# Patient Record
Sex: FEMALE | Race: WHITE | NOT HISPANIC OR LATINO | Employment: OTHER | URBAN - METROPOLITAN AREA
[De-identification: names, ages, dates, MRNs, and addresses within clinical notes are randomized per-mention and may not be internally consistent; named-entity substitution may affect disease eponyms.]

---

## 2019-10-30 ENCOUNTER — TRANSCRIBE ORDERS (OUTPATIENT)
Dept: ADMINISTRATIVE | Facility: HOSPITAL | Age: 74
End: 2019-10-30

## 2019-10-30 DIAGNOSIS — Z12.31 ENCOUNTER FOR SCREENING MAMMOGRAM FOR BREAST CANCER: Primary | ICD-10-CM

## 2019-12-13 ENCOUNTER — HOSPITAL ENCOUNTER (OUTPATIENT)
Dept: MAMMOGRAPHY | Facility: HOSPITAL | Age: 74
Discharge: HOME | End: 2019-12-13
Payer: COMMERCIAL

## 2019-12-13 VITALS — HEIGHT: 64 IN | BODY MASS INDEX: 25.58 KG/M2

## 2019-12-13 DIAGNOSIS — Z12.31 ENCOUNTER FOR SCREENING MAMMOGRAM FOR BREAST CANCER: ICD-10-CM

## 2019-12-13 PROCEDURE — 77063 BREAST TOMOSYNTHESIS BI: CPT

## 2019-12-13 PROCEDURE — 77067 SCR MAMMO BI INCL CAD: CPT

## 2019-12-18 ENCOUNTER — APPOINTMENT (OUTPATIENT)
Dept: LAB | Facility: HOSPITAL | Age: 74
End: 2019-12-18
Payer: COMMERCIAL

## 2019-12-18 ENCOUNTER — TRANSCRIBE ORDERS (OUTPATIENT)
Dept: LAB | Facility: HOSPITAL | Age: 74
End: 2019-12-18

## 2019-12-18 DIAGNOSIS — I10 ESSENTIAL HYPERTENSION, MALIGNANT: Primary | ICD-10-CM

## 2019-12-18 DIAGNOSIS — I10 ESSENTIAL HYPERTENSION, MALIGNANT: ICD-10-CM

## 2019-12-18 LAB
ALBUMIN SERPL BCP-MCNC: 4.3 G/DL (ref 3.5–5.7)
ALP SERPL-CCNC: 43 U/L (ref 55–165)
ALT SERPL W P-5'-P-CCNC: 12 U/L (ref 7–52)
ANION GAP SERPL CALCULATED.3IONS-SCNC: 6 MMOL/L (ref 4–13)
AST SERPL W P-5'-P-CCNC: 17 U/L (ref 13–39)
BILIRUB SERPL-MCNC: 0.9 MG/DL (ref 0.2–1)
BUN SERPL-MCNC: 20 MG/DL (ref 7–25)
CALCIUM SERPL-MCNC: 9.6 MG/DL (ref 8.6–10.5)
CHLORIDE SERPL-SCNC: 104 MMOL/L (ref 98–107)
CHOLEST SERPL-MCNC: 222 MG/DL (ref 0–200)
CO2 SERPL-SCNC: 29 MMOL/L (ref 21–31)
CREAT SERPL-MCNC: 0.98 MG/DL (ref 0.6–1.2)
ERYTHROCYTE [DISTWIDTH] IN BLOOD BY AUTOMATED COUNT: 14.2 % (ref 11.5–14.5)
GFR SERPL CREATININE-BSD FRML MDRD: 57 ML/MIN/1.73SQ M
GLUCOSE P FAST SERPL-MCNC: 98 MG/DL (ref 65–99)
HCT VFR BLD AUTO: 39.5 % (ref 42–47)
HDLC SERPL-MCNC: 60 MG/DL
HGB BLD-MCNC: 13.2 G/DL (ref 12–16)
LDLC SERPL CALC-MCNC: 140 MG/DL (ref 0–100)
MCH RBC QN AUTO: 31.5 PG (ref 26–34)
MCHC RBC AUTO-ENTMCNC: 33.5 G/DL (ref 31–37)
MCV RBC AUTO: 94 FL (ref 81–99)
NONHDLC SERPL-MCNC: 162 MG/DL
PLATELET # BLD AUTO: 220 THOUSANDS/UL (ref 149–390)
PMV BLD AUTO: 8.4 FL (ref 8.6–11.7)
POTASSIUM SERPL-SCNC: 4.4 MMOL/L (ref 3.5–5.5)
PROT SERPL-MCNC: 6.8 G/DL (ref 6.4–8.9)
RBC # BLD AUTO: 4.2 MILLION/UL (ref 3.9–5.2)
SODIUM SERPL-SCNC: 139 MMOL/L (ref 134–143)
TRIGL SERPL-MCNC: 110 MG/DL (ref 44–166)
WBC # BLD AUTO: 6.1 THOUSAND/UL (ref 4.8–10.8)

## 2019-12-18 PROCEDURE — 80061 LIPID PANEL: CPT

## 2019-12-18 PROCEDURE — 36415 COLL VENOUS BLD VENIPUNCTURE: CPT

## 2019-12-18 PROCEDURE — 85027 COMPLETE CBC AUTOMATED: CPT

## 2019-12-18 PROCEDURE — 80053 COMPREHEN METABOLIC PANEL: CPT

## 2020-09-25 ENCOUNTER — TRANSCRIBE ORDERS (OUTPATIENT)
Dept: ADMINISTRATIVE | Facility: HOSPITAL | Age: 75
End: 2020-09-25

## 2020-09-25 DIAGNOSIS — R41.3 MEMORY LOSS: Primary | ICD-10-CM

## 2020-09-25 DIAGNOSIS — IMO0001 ASYMMETRICAL RIGHT SENSORINEURAL HEARING LOSS: ICD-10-CM

## 2020-10-15 ENCOUNTER — TRANSCRIBE ORDERS (OUTPATIENT)
Dept: ADMINISTRATIVE | Facility: HOSPITAL | Age: 75
End: 2020-10-15

## 2020-10-15 DIAGNOSIS — Z78.0 ASYMPTOMATIC MENOPAUSAL STATE: ICD-10-CM

## 2020-10-15 DIAGNOSIS — E28.39 OTHER PRIMARY OVARIAN FAILURE: ICD-10-CM

## 2020-10-15 DIAGNOSIS — Z12.31 ENCOUNTER FOR SCREENING MAMMOGRAM FOR MALIGNANT NEOPLASM OF BREAST: Primary | ICD-10-CM

## 2020-10-21 ENCOUNTER — HOSPITAL ENCOUNTER (OUTPATIENT)
Dept: MAMMOGRAPHY | Facility: HOSPITAL | Age: 75
Discharge: HOME | End: 2020-10-21
Payer: COMMERCIAL

## 2020-10-21 ENCOUNTER — HOSPITAL ENCOUNTER (OUTPATIENT)
Dept: BONE DENSITY | Facility: HOSPITAL | Age: 75
Discharge: HOME | End: 2020-10-21
Payer: COMMERCIAL

## 2020-10-21 DIAGNOSIS — Z12.31 ENCOUNTER FOR SCREENING MAMMOGRAM FOR MALIGNANT NEOPLASM OF BREAST: ICD-10-CM

## 2020-10-21 DIAGNOSIS — Z78.0 ASYMPTOMATIC MENOPAUSAL STATE: ICD-10-CM

## 2020-10-21 DIAGNOSIS — E28.39 OTHER PRIMARY OVARIAN FAILURE: ICD-10-CM

## 2020-10-21 PROCEDURE — 77080 DXA BONE DENSITY AXIAL: CPT

## 2020-10-23 ENCOUNTER — HOSPITAL ENCOUNTER (OUTPATIENT)
Dept: MRI IMAGING | Facility: HOSPITAL | Age: 75
Discharge: HOME | End: 2020-10-23
Payer: COMMERCIAL

## 2020-10-23 DIAGNOSIS — R41.3 MEMORY LOSS: ICD-10-CM

## 2020-10-23 DIAGNOSIS — IMO0001 ASYMMETRICAL RIGHT SENSORINEURAL HEARING LOSS: ICD-10-CM

## 2020-10-23 PROCEDURE — 70553 MRI BRAIN STEM W/O & W/DYE: CPT

## 2020-10-23 PROCEDURE — G1004 CDSM NDSC: HCPCS

## 2020-10-23 PROCEDURE — A9577 INJ MULTIHANCE: HCPCS | Performed by: FAMILY MEDICINE

## 2020-10-23 RX ADMIN — GADOBENATE DIMEGLUMINE 7.5 ML: 529 INJECTION, SOLUTION INTRAVENOUS at 08:44

## 2021-01-22 DIAGNOSIS — Z23 ENCOUNTER FOR IMMUNIZATION: ICD-10-CM

## 2021-01-26 ENCOUNTER — IMMUNIZATIONS (OUTPATIENT)
Dept: FAMILY MEDICINE CLINIC | Facility: HOSPITAL | Age: 76
End: 2021-01-26

## 2021-01-26 DIAGNOSIS — Z23 ENCOUNTER FOR IMMUNIZATION: Primary | ICD-10-CM

## 2021-01-26 PROCEDURE — 0011A SARS-COV-2 / COVID-19 MRNA VACCINE (MODERNA) 100 MCG: CPT

## 2021-01-26 PROCEDURE — 91301 SARS-COV-2 / COVID-19 MRNA VACCINE (MODERNA) 100 MCG: CPT

## 2021-03-01 ENCOUNTER — IMMUNIZATIONS (OUTPATIENT)
Dept: FAMILY MEDICINE CLINIC | Facility: HOSPITAL | Age: 76
End: 2021-03-01

## 2021-03-01 DIAGNOSIS — Z23 ENCOUNTER FOR IMMUNIZATION: Primary | ICD-10-CM

## 2021-03-01 PROCEDURE — 0012A SARS-COV-2 / COVID-19 MRNA VACCINE (MODERNA) 100 MCG: CPT

## 2021-03-01 PROCEDURE — 91301 SARS-COV-2 / COVID-19 MRNA VACCINE (MODERNA) 100 MCG: CPT

## 2021-05-13 ENCOUNTER — HOSPITAL ENCOUNTER (OUTPATIENT)
Dept: MAMMOGRAPHY | Facility: HOSPITAL | Age: 76
Discharge: HOME | End: 2021-05-13
Payer: COMMERCIAL

## 2021-05-13 VITALS — HEIGHT: 64 IN | BODY MASS INDEX: 27.31 KG/M2 | WEIGHT: 160 LBS

## 2021-05-13 PROCEDURE — 77063 BREAST TOMOSYNTHESIS BI: CPT

## 2021-05-13 PROCEDURE — 77067 SCR MAMMO BI INCL CAD: CPT

## 2021-06-21 ENCOUNTER — APPOINTMENT (OUTPATIENT)
Dept: LAB | Facility: HOSPITAL | Age: 76
End: 2021-06-21
Payer: COMMERCIAL

## 2021-06-21 DIAGNOSIS — I10 HYPERTENSION, UNSPECIFIED TYPE: ICD-10-CM

## 2021-06-21 LAB
ALBUMIN SERPL BCP-MCNC: 4.4 G/DL (ref 3.5–5.7)
ALP SERPL-CCNC: 44 U/L (ref 55–165)
ALT SERPL W P-5'-P-CCNC: 19 U/L (ref 7–52)
ANION GAP SERPL CALCULATED.3IONS-SCNC: 7 MMOL/L (ref 4–13)
AST SERPL W P-5'-P-CCNC: 23 U/L (ref 13–39)
BILIRUB SERPL-MCNC: 0.9 MG/DL (ref 0.2–1)
BUN SERPL-MCNC: 21 MG/DL (ref 7–25)
CALCIUM SERPL-MCNC: 9.7 MG/DL (ref 8.6–10.5)
CHLORIDE SERPL-SCNC: 104 MMOL/L (ref 98–107)
CO2 SERPL-SCNC: 29 MMOL/L (ref 21–31)
CREAT SERPL-MCNC: 1.18 MG/DL (ref 0.6–1.2)
GFR SERPL CREATININE-BSD FRML MDRD: 45 ML/MIN/1.73SQ M
GLUCOSE P FAST SERPL-MCNC: 98 MG/DL (ref 65–99)
POTASSIUM SERPL-SCNC: 4.7 MMOL/L (ref 3.5–5.5)
PROT SERPL-MCNC: 7.1 G/DL (ref 6.4–8.9)
SODIUM SERPL-SCNC: 140 MMOL/L (ref 134–143)

## 2021-06-21 PROCEDURE — 36415 COLL VENOUS BLD VENIPUNCTURE: CPT

## 2021-06-21 PROCEDURE — 80053 COMPREHEN METABOLIC PANEL: CPT

## 2021-11-19 ENCOUNTER — APPOINTMENT (OUTPATIENT)
Dept: LAB | Facility: HOSPITAL | Age: 76
End: 2021-11-19
Payer: COMMERCIAL

## 2021-11-19 DIAGNOSIS — I10 HYPERTENSION, UNSPECIFIED TYPE: ICD-10-CM

## 2021-11-19 DIAGNOSIS — E78.2 MIXED HYPERLIPIDEMIA: ICD-10-CM

## 2021-11-19 LAB
ALBUMIN SERPL BCP-MCNC: 4.2 G/DL (ref 3.5–5.7)
ALP SERPL-CCNC: 50 U/L (ref 55–165)
ALT SERPL W P-5'-P-CCNC: 15 U/L (ref 7–52)
ANION GAP SERPL CALCULATED.3IONS-SCNC: 9 MMOL/L (ref 4–13)
AST SERPL W P-5'-P-CCNC: 19 U/L (ref 13–39)
BILIRUB SERPL-MCNC: 0.9 MG/DL (ref 0.2–1)
BUN SERPL-MCNC: 23 MG/DL (ref 7–25)
CALCIUM SERPL-MCNC: 9.4 MG/DL (ref 8.6–10.5)
CHLORIDE SERPL-SCNC: 105 MMOL/L (ref 98–107)
CHOLEST SERPL-MCNC: 236 MG/DL (ref 0–200)
CO2 SERPL-SCNC: 24 MMOL/L (ref 21–31)
CREAT SERPL-MCNC: 1.17 MG/DL (ref 0.6–1.2)
ERYTHROCYTE [DISTWIDTH] IN BLOOD BY AUTOMATED COUNT: 13.9 % (ref 11.5–14.5)
GFR SERPL CREATININE-BSD FRML MDRD: 45 ML/MIN/1.73SQ M
GLUCOSE P FAST SERPL-MCNC: 95 MG/DL (ref 65–99)
HCT VFR BLD AUTO: 39.4 % (ref 42–47)
HDLC SERPL-MCNC: 52 MG/DL
HGB BLD-MCNC: 13 G/DL (ref 12–16)
LDLC SERPL CALC-MCNC: 149 MG/DL (ref 0–100)
MCH RBC QN AUTO: 30.7 PG (ref 26–34)
MCHC RBC AUTO-ENTMCNC: 33 G/DL (ref 31–37)
MCV RBC AUTO: 93 FL (ref 81–99)
NONHDLC SERPL-MCNC: 184 MG/DL
PLATELET # BLD AUTO: 263 THOUSANDS/UL (ref 149–390)
PMV BLD AUTO: 8.7 FL (ref 8.6–11.7)
POTASSIUM SERPL-SCNC: 4 MMOL/L (ref 3.5–5.5)
PROT SERPL-MCNC: 7.2 G/DL (ref 6.4–8.9)
RBC # BLD AUTO: 4.24 MILLION/UL (ref 3.9–5.2)
SODIUM SERPL-SCNC: 138 MMOL/L (ref 134–143)
TRIGL SERPL-MCNC: 177 MG/DL
TSH SERPL DL<=0.05 MIU/L-ACNC: 2.48 UIU/ML (ref 0.45–5.33)
WBC # BLD AUTO: 8.9 THOUSAND/UL (ref 4.8–10.8)

## 2021-11-19 PROCEDURE — 36415 COLL VENOUS BLD VENIPUNCTURE: CPT

## 2021-11-19 PROCEDURE — 85027 COMPLETE CBC AUTOMATED: CPT

## 2021-11-19 PROCEDURE — 80061 LIPID PANEL: CPT

## 2021-11-19 PROCEDURE — 80053 COMPREHEN METABOLIC PANEL: CPT

## 2021-11-19 PROCEDURE — 84443 ASSAY THYROID STIM HORMONE: CPT

## 2022-02-24 ENCOUNTER — APPOINTMENT (OUTPATIENT)
Dept: LAB | Facility: HOSPITAL | Age: 77
End: 2022-02-24
Payer: COMMERCIAL

## 2022-02-24 DIAGNOSIS — I10 HYPERTENSION, UNSPECIFIED TYPE: ICD-10-CM

## 2022-02-24 DIAGNOSIS — E78.2 MIXED HYPERLIPIDEMIA: ICD-10-CM

## 2022-02-24 LAB
25(OH)D3 SERPL-MCNC: 27.4 NG/ML (ref 30–100)
ALBUMIN SERPL BCP-MCNC: 4.4 G/DL (ref 3.5–5)
ALP SERPL-CCNC: 53 U/L (ref 34–104)
ALT SERPL W P-5'-P-CCNC: 24 U/L (ref 7–52)
ANION GAP SERPL CALCULATED.3IONS-SCNC: 7 MMOL/L (ref 4–13)
AST SERPL W P-5'-P-CCNC: 24 U/L (ref 13–39)
BILIRUB SERPL-MCNC: 0.75 MG/DL (ref 0.2–1)
BUN SERPL-MCNC: 19 MG/DL (ref 5–25)
CALCIUM SERPL-MCNC: 9.9 MG/DL (ref 8.4–10.2)
CHLORIDE SERPL-SCNC: 104 MMOL/L (ref 96–108)
CHOLEST SERPL-MCNC: 254 MG/DL
CO2 SERPL-SCNC: 28 MMOL/L (ref 21–32)
CREAT SERPL-MCNC: 1.16 MG/DL (ref 0.6–1.3)
ERYTHROCYTE [DISTWIDTH] IN BLOOD BY AUTOMATED COUNT: 13.7 % (ref 11.6–15.1)
GFR SERPL CREATININE-BSD FRML MDRD: 45 ML/MIN/1.73SQ M
GLUCOSE P FAST SERPL-MCNC: 99 MG/DL (ref 65–99)
HCT VFR BLD AUTO: 41.2 % (ref 34.8–46.1)
HDLC SERPL-MCNC: 51 MG/DL
HGB BLD-MCNC: 13.3 G/DL (ref 11.5–15.4)
LDLC SERPL CALC-MCNC: 167 MG/DL (ref 0–100)
MCH RBC QN AUTO: 31.5 PG (ref 26.8–34.3)
MCHC RBC AUTO-ENTMCNC: 32.3 G/DL (ref 31.4–37.4)
MCV RBC AUTO: 98 FL (ref 82–98)
NONHDLC SERPL-MCNC: 203 MG/DL
PLATELET # BLD AUTO: 204 THOUSANDS/UL (ref 149–390)
PMV BLD AUTO: 10.6 FL (ref 8.9–12.7)
POTASSIUM SERPL-SCNC: 4.5 MMOL/L (ref 3.5–5.3)
PROT SERPL-MCNC: 7.6 G/DL (ref 6.4–8.4)
RBC # BLD AUTO: 4.22 MILLION/UL (ref 3.81–5.12)
SODIUM SERPL-SCNC: 139 MMOL/L (ref 135–147)
TRIGL SERPL-MCNC: 179 MG/DL
TSH SERPL DL<=0.05 MIU/L-ACNC: 2.74 UIU/ML (ref 0.45–5.33)
WBC # BLD AUTO: 7.4 THOUSAND/UL (ref 4.31–10.16)

## 2022-02-24 PROCEDURE — 80061 LIPID PANEL: CPT

## 2022-02-24 PROCEDURE — 36415 COLL VENOUS BLD VENIPUNCTURE: CPT

## 2022-02-24 PROCEDURE — 85027 COMPLETE CBC AUTOMATED: CPT

## 2022-02-24 PROCEDURE — 82306 VITAMIN D 25 HYDROXY: CPT

## 2022-02-24 PROCEDURE — 84443 ASSAY THYROID STIM HORMONE: CPT

## 2022-02-24 PROCEDURE — 80053 COMPREHEN METABOLIC PANEL: CPT

## 2022-05-11 ENCOUNTER — APPOINTMENT (OUTPATIENT)
Dept: LAB | Facility: HOSPITAL | Age: 77
End: 2022-05-11
Payer: COMMERCIAL

## 2022-05-11 DIAGNOSIS — E78.5 HYPERLIPIDEMIA, UNSPECIFIED HYPERLIPIDEMIA TYPE: ICD-10-CM

## 2022-05-11 LAB
25(OH)D3 SERPL-MCNC: 28.6 NG/ML (ref 30–100)
ALBUMIN SERPL BCP-MCNC: 4.4 G/DL (ref 3.5–5)
ALP SERPL-CCNC: 59 U/L (ref 34–104)
ALT SERPL W P-5'-P-CCNC: 15 U/L (ref 7–52)
ANION GAP SERPL CALCULATED.3IONS-SCNC: 8 MMOL/L (ref 4–13)
AST SERPL W P-5'-P-CCNC: 19 U/L (ref 13–39)
BILIRUB SERPL-MCNC: 0.97 MG/DL (ref 0.2–1)
BUN SERPL-MCNC: 19 MG/DL (ref 5–25)
CALCIUM SERPL-MCNC: 9.7 MG/DL (ref 8.4–10.2)
CHLORIDE SERPL-SCNC: 104 MMOL/L (ref 96–108)
CHOLEST SERPL-MCNC: 228 MG/DL
CO2 SERPL-SCNC: 27 MMOL/L (ref 21–32)
CREAT SERPL-MCNC: 1.21 MG/DL (ref 0.6–1.3)
ERYTHROCYTE [DISTWIDTH] IN BLOOD BY AUTOMATED COUNT: 13.5 % (ref 11.6–15.1)
GFR SERPL CREATININE-BSD FRML MDRD: 43 ML/MIN/1.73SQ M
GLUCOSE P FAST SERPL-MCNC: 99 MG/DL (ref 65–99)
HCT VFR BLD AUTO: 41.4 % (ref 34.8–46.1)
HDLC SERPL-MCNC: 50 MG/DL
HGB BLD-MCNC: 13.3 G/DL (ref 11.5–15.4)
LDLC SERPL CALC-MCNC: 136 MG/DL (ref 0–100)
MCH RBC QN AUTO: 31.1 PG (ref 26.8–34.3)
MCHC RBC AUTO-ENTMCNC: 32.1 G/DL (ref 31.4–37.4)
MCV RBC AUTO: 97 FL (ref 82–98)
NONHDLC SERPL-MCNC: 178 MG/DL
PLATELET # BLD AUTO: 224 THOUSANDS/UL (ref 149–390)
PMV BLD AUTO: 10.6 FL (ref 8.9–12.7)
POTASSIUM SERPL-SCNC: 4.2 MMOL/L (ref 3.5–5.3)
PROT SERPL-MCNC: 7.5 G/DL (ref 6.4–8.4)
RBC # BLD AUTO: 4.27 MILLION/UL (ref 3.81–5.12)
SODIUM SERPL-SCNC: 139 MMOL/L (ref 135–147)
TRIGL SERPL-MCNC: 212 MG/DL
TSH SERPL DL<=0.05 MIU/L-ACNC: 2.59 UIU/ML (ref 0.45–4.5)
WBC # BLD AUTO: 7.36 THOUSAND/UL (ref 4.31–10.16)

## 2022-05-11 PROCEDURE — 84443 ASSAY THYROID STIM HORMONE: CPT

## 2022-05-11 PROCEDURE — 36415 COLL VENOUS BLD VENIPUNCTURE: CPT

## 2022-05-11 PROCEDURE — 82306 VITAMIN D 25 HYDROXY: CPT

## 2022-05-11 PROCEDURE — 85027 COMPLETE CBC AUTOMATED: CPT

## 2022-05-11 PROCEDURE — 80053 COMPREHEN METABOLIC PANEL: CPT

## 2022-05-11 PROCEDURE — 80061 LIPID PANEL: CPT

## 2022-05-16 ENCOUNTER — HOSPITAL ENCOUNTER (OUTPATIENT)
Dept: MAMMOGRAPHY | Facility: HOSPITAL | Age: 77
Discharge: HOME | End: 2022-05-16
Payer: COMMERCIAL

## 2022-05-16 VITALS — BODY MASS INDEX: 23.9 KG/M2 | WEIGHT: 140 LBS | HEIGHT: 64 IN

## 2022-05-16 DIAGNOSIS — Z12.31 ENCOUNTER FOR SCREENING MAMMOGRAM FOR MALIGNANT NEOPLASM OF BREAST: ICD-10-CM

## 2022-05-16 PROCEDURE — 77067 SCR MAMMO BI INCL CAD: CPT

## 2022-05-16 PROCEDURE — 77063 BREAST TOMOSYNTHESIS BI: CPT

## 2022-07-09 ENCOUNTER — APPOINTMENT (OUTPATIENT)
Dept: LAB | Facility: HOSPITAL | Age: 77
End: 2022-07-09
Payer: COMMERCIAL

## 2022-07-09 DIAGNOSIS — I10 HYPERTENSION, UNSPECIFIED TYPE: ICD-10-CM

## 2022-07-09 LAB
ALBUMIN SERPL BCP-MCNC: 4.2 G/DL (ref 3.5–5)
ALP SERPL-CCNC: 54 U/L (ref 34–104)
ALT SERPL W P-5'-P-CCNC: 15 U/L (ref 7–52)
ANION GAP SERPL CALCULATED.3IONS-SCNC: 8 MMOL/L (ref 4–13)
AST SERPL W P-5'-P-CCNC: 18 U/L (ref 13–39)
BILIRUB SERPL-MCNC: 0.7 MG/DL (ref 0.2–1)
BUN SERPL-MCNC: 22 MG/DL (ref 5–25)
CALCIUM SERPL-MCNC: 9.6 MG/DL (ref 8.4–10.2)
CHLORIDE SERPL-SCNC: 102 MMOL/L (ref 96–108)
CO2 SERPL-SCNC: 27 MMOL/L (ref 21–32)
CREAT SERPL-MCNC: 1.07 MG/DL (ref 0.6–1.3)
GFR SERPL CREATININE-BSD FRML MDRD: 50 ML/MIN/1.73SQ M
GLUCOSE P FAST SERPL-MCNC: 97 MG/DL (ref 65–99)
POTASSIUM SERPL-SCNC: 4.5 MMOL/L (ref 3.5–5.3)
PROT SERPL-MCNC: 7.3 G/DL (ref 6.4–8.4)
SODIUM SERPL-SCNC: 137 MMOL/L (ref 135–147)

## 2022-07-09 PROCEDURE — 36415 COLL VENOUS BLD VENIPUNCTURE: CPT

## 2022-07-09 PROCEDURE — 80053 COMPREHEN METABOLIC PANEL: CPT

## 2022-07-25 ENCOUNTER — APPOINTMENT (OUTPATIENT)
Dept: RADIOLOGY | Facility: CLINIC | Age: 77
End: 2022-07-25
Payer: COMMERCIAL

## 2022-07-25 DIAGNOSIS — M25.551 RIGHT HIP PAIN: ICD-10-CM

## 2022-07-25 PROCEDURE — 73502 X-RAY EXAM HIP UNI 2-3 VIEWS: CPT

## 2023-01-19 ENCOUNTER — APPOINTMENT (OUTPATIENT)
Dept: LAB | Facility: HOSPITAL | Age: 78
End: 2023-01-19

## 2023-01-19 DIAGNOSIS — E55.9 VITAMIN D DEFICIENCY: ICD-10-CM

## 2023-01-19 DIAGNOSIS — I10 HYPERTENSION, UNSPECIFIED TYPE: ICD-10-CM

## 2023-01-19 DIAGNOSIS — E78.2 MIXED HYPERLIPIDEMIA: ICD-10-CM

## 2023-01-19 LAB
25(OH)D3 SERPL-MCNC: 22.4 NG/ML (ref 30–100)
ALBUMIN SERPL BCP-MCNC: 4.4 G/DL (ref 3.5–5)
ALP SERPL-CCNC: 48 U/L (ref 34–104)
ALT SERPL W P-5'-P-CCNC: 14 U/L (ref 7–52)
ANION GAP SERPL CALCULATED.3IONS-SCNC: 8 MMOL/L (ref 4–13)
AST SERPL W P-5'-P-CCNC: 17 U/L (ref 13–39)
BILIRUB SERPL-MCNC: 0.93 MG/DL (ref 0.2–1)
BUN SERPL-MCNC: 25 MG/DL (ref 5–25)
CALCIUM SERPL-MCNC: 9.8 MG/DL (ref 8.4–10.2)
CHLORIDE SERPL-SCNC: 104 MMOL/L (ref 96–108)
CHOLEST SERPL-MCNC: 195 MG/DL
CO2 SERPL-SCNC: 28 MMOL/L (ref 21–32)
CREAT SERPL-MCNC: 1.25 MG/DL (ref 0.6–1.3)
ERYTHROCYTE [DISTWIDTH] IN BLOOD BY AUTOMATED COUNT: 13.1 % (ref 11.6–15.1)
GFR SERPL CREATININE-BSD FRML MDRD: 41 ML/MIN/1.73SQ M
GLUCOSE P FAST SERPL-MCNC: 98 MG/DL (ref 65–99)
HCT VFR BLD AUTO: 41.8 % (ref 34.8–46.1)
HDLC SERPL-MCNC: 60 MG/DL
HGB BLD-MCNC: 13.9 G/DL (ref 11.5–15.4)
LDLC SERPL CALC-MCNC: 108 MG/DL (ref 0–100)
MCH RBC QN AUTO: 31.6 PG (ref 26.8–34.3)
MCHC RBC AUTO-ENTMCNC: 33.3 G/DL (ref 31.4–37.4)
MCV RBC AUTO: 95 FL (ref 82–98)
NONHDLC SERPL-MCNC: 135 MG/DL
PLATELET # BLD AUTO: 237 THOUSANDS/UL (ref 149–390)
PMV BLD AUTO: 10.4 FL (ref 8.9–12.7)
POTASSIUM SERPL-SCNC: 4.5 MMOL/L (ref 3.5–5.3)
PROT SERPL-MCNC: 7.4 G/DL (ref 6.4–8.4)
RBC # BLD AUTO: 4.4 MILLION/UL (ref 3.81–5.12)
SODIUM SERPL-SCNC: 140 MMOL/L (ref 135–147)
TRIGL SERPL-MCNC: 135 MG/DL
TSH SERPL DL<=0.05 MIU/L-ACNC: 2.2 UIU/ML (ref 0.45–4.5)
WBC # BLD AUTO: 7.17 THOUSAND/UL (ref 4.31–10.16)

## 2023-03-30 ENCOUNTER — APPOINTMENT (EMERGENCY)
Dept: CT IMAGING | Facility: HOSPITAL | Age: 78
End: 2023-03-30

## 2023-03-30 ENCOUNTER — HOSPITAL ENCOUNTER (EMERGENCY)
Facility: HOSPITAL | Age: 78
Discharge: HOME | End: 2023-03-30
Attending: EMERGENCY MEDICINE

## 2023-03-30 ENCOUNTER — APPOINTMENT (EMERGENCY)
Dept: RADIOLOGY | Facility: HOSPITAL | Age: 78
End: 2023-03-30

## 2023-03-30 VITALS
DIASTOLIC BLOOD PRESSURE: 86 MMHG | HEART RATE: 61 BPM | RESPIRATION RATE: 18 BRPM | OXYGEN SATURATION: 98 % | TEMPERATURE: 98 F | SYSTOLIC BLOOD PRESSURE: 160 MMHG

## 2023-03-30 DIAGNOSIS — S09.90XA INJURY OF HEAD, INITIAL ENCOUNTER: ICD-10-CM
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
## 2023-03-30 DIAGNOSIS — Z23 NEED FOR TETANUS BOOSTER: ICD-10-CM

## 2023-03-30 DIAGNOSIS — S01.81XA FACIAL LACERATION, INITIAL ENCOUNTER: ICD-10-CM

## 2023-03-30 DIAGNOSIS — R42 LIGHTHEADED: ICD-10-CM

## 2023-03-30 LAB
2HR DELTA HS TROPONIN: 0 NG/L
ABO GROUP BLD: NORMAL
ALBUMIN SERPL BCP-MCNC: 4.5 G/DL (ref 3.5–5)
ALP SERPL-CCNC: 50 U/L (ref 34–104)
ALT SERPL W P-5'-P-CCNC: 16 U/L (ref 7–52)
ANION GAP SERPL CALCULATED.3IONS-SCNC: 9 MMOL/L (ref 4–13)
AST SERPL W P-5'-P-CCNC: 22 U/L (ref 13–39)
BASOPHILS # BLD AUTO: 0.02 THOUSANDS/ÂΜL (ref 0–0.1)
BASOPHILS NFR BLD AUTO: 0 % (ref 0–1)
BILIRUB SERPL-MCNC: 0.84 MG/DL (ref 0.2–1)
BLD GP AB SCN SERPL QL: NEGATIVE
BUN SERPL-MCNC: 18 MG/DL (ref 5–25)
CALCIUM SERPL-MCNC: 10 MG/DL (ref 8.4–10.2)
CARDIAC TROPONIN I PNL SERPL HS: 10 NG/L
CARDIAC TROPONIN I PNL SERPL HS: 10 NG/L
CHLORIDE SERPL-SCNC: 103 MMOL/L (ref 96–108)
CO2 SERPL-SCNC: 26 MMOL/L (ref 21–32)
CREAT SERPL-MCNC: 1.12 MG/DL (ref 0.6–1.3)
EOSINOPHIL # BLD AUTO: 0.03 THOUSAND/ÂΜL (ref 0–0.61)
EOSINOPHIL NFR BLD AUTO: 0 % (ref 0–6)
ERYTHROCYTE [DISTWIDTH] IN BLOOD BY AUTOMATED COUNT: 13.2 % (ref 11.6–15.1)
GFR SERPL CREATININE-BSD FRML MDRD: 47 ML/MIN/1.73SQ M
GLUCOSE SERPL-MCNC: 95 MG/DL (ref 65–140)
HCT VFR BLD AUTO: 42.1 % (ref 34.8–46.1)
HGB BLD-MCNC: 13.8 G/DL (ref 11.5–15.4)
IMM GRANULOCYTES # BLD AUTO: 0.07 THOUSAND/UL (ref 0–0.2)
IMM GRANULOCYTES NFR BLD AUTO: 1 % (ref 0–2)
LYMPHOCYTES # BLD AUTO: 1.2 THOUSANDS/ÂΜL (ref 0.6–4.47)
LYMPHOCYTES NFR BLD AUTO: 17 % (ref 14–44)
MAGNESIUM SERPL-MCNC: 2.1 MG/DL (ref 1.9–2.7)
MCH RBC QN AUTO: 31.3 PG (ref 26.8–34.3)
MCHC RBC AUTO-ENTMCNC: 32.8 G/DL (ref 31.4–37.4)
MCV RBC AUTO: 96 FL (ref 82–98)
MONOCYTES # BLD AUTO: 0.41 THOUSAND/ÂΜL (ref 0.17–1.22)
MONOCYTES NFR BLD AUTO: 6 % (ref 4–12)
NEUTROPHILS # BLD AUTO: 5.42 THOUSANDS/ÂΜL (ref 1.85–7.62)
NEUTS SEG NFR BLD AUTO: 76 % (ref 43–75)
NRBC BLD AUTO-RTO: 0 /100 WBCS
PLATELET # BLD AUTO: 229 THOUSANDS/UL (ref 149–390)
PMV BLD AUTO: 10.4 FL (ref 8.9–12.7)
POTASSIUM SERPL-SCNC: 4 MMOL/L (ref 3.5–5.3)
PROT SERPL-MCNC: 7.2 G/DL (ref 6.4–8.4)
RBC # BLD AUTO: 4.41 MILLION/UL (ref 3.81–5.12)
RH BLD: POSITIVE
SODIUM SERPL-SCNC: 138 MMOL/L (ref 135–147)
SPECIMEN EXPIRATION DATE: NORMAL
WBC # BLD AUTO: 7.15 THOUSAND/UL (ref 4.31–10.16)

## 2023-03-30 RX ORDER — HYDRALAZINE HYDROCHLORIDE 20 MG/ML
5 INJECTION INTRAMUSCULAR; INTRAVENOUS ONCE
Status: COMPLETED | OUTPATIENT
Start: 2023-03-30 | End: 2023-03-30

## 2023-03-30 RX ORDER — SODIUM CHLORIDE 9 MG/ML
3 INJECTION INTRAVENOUS
Status: DISCONTINUED | OUTPATIENT
Start: 2023-03-30 | End: 2023-03-30 | Stop reason: HOSPADM

## 2023-03-30 RX ORDER — NEBIVOLOL 5 MG/1
5 TABLET ORAL DAILY
Status: DISCONTINUED | OUTPATIENT
Start: 2023-03-30 | End: 2023-03-30

## 2023-03-30 RX ADMIN — TETANUS TOXOID, REDUCED DIPHTHERIA TOXOID AND ACELLULAR PERTUSSIS VACCINE, ADSORBED 0.5 ML: 5; 2.5; 8; 8; 2.5 SUSPENSION INTRAMUSCULAR at 13:22

## 2023-03-30 RX ADMIN — HYDRALAZINE HYDROCHLORIDE 5 MG: 20 INJECTION INTRAMUSCULAR; INTRAVENOUS at 13:56

## 2023-03-30 NOTE — DISCHARGE INSTRUCTIONS
Chest xray: Small densities in right base may be granulomas  Would do a short term follow up dual energy X-ray as outpatient  Please follow up with you PCP for this  Avoid getting your face wet for the next 24 hours    Your tetanus was updated today  Follow up with your PCP  Return for worsening of symptoms

## 2023-03-30 NOTE — ED PROVIDER NOTES
"Emergency Department Trauma Note  Marty Warren 65 y o  female MRN: [id number]  Unit/Bed#: ED 25/ED 25 Encounter: [id number]      Trauma Alert: Trauma Acuity: Trauma Evaluation  Model of Arrival: Mode of Arrival: Direct from scene via    Trauma Team: Current Providers  Attending Provider: Abbi Garrett MD  Nurse Practitioner: RICO Koch  Registered Nurse: Jackie Rodarte RN  Consultants:     None      History of Present Illness     Chief Complaint:   Chief Complaint   Patient presents with   • Fall     Patient reports she was in the garden looking, tripped and fell  Patient hit her head, no blood thinner or LOC      HPI:  Marty Warren is a 65 y o  female who presents with fall  Mechanism:           Patient is a 69-year-old female past medical history of high cholesterol, hypertension arriving for evaluation after she had a fall in her garden  Patient states that she was already bent over and became lightheaded causing her to fall forward  Patient arrives with laceration to her left eyebrow  Patient denies LOC  Patient's  was present and confirms patient did not lose consciousness  Notes that patient did lose her balance and fall forward  Denies any chest pain, shortness of breath prior to this  Patient reports that she only felt lightheaded  Patient reports at that time she was gardening  Patient's  reports that patient struck head off of \"macadam \" Pt has 1 cm laceration above left eye  Unsure of last tetanus  Patient denies chest pain, shortness of breath, abdominal pain, nausea/vomiting, blurred/double vision, dizziness  Patient denies syncope/near syncope  Patient denies headache  Patient reporting that her symptoms of lightheadedness resolved immediately and did not return  Review of Systems   Constitutional: Negative  HENT: Negative  Eyes: Negative  Respiratory: Negative  Cardiovascular: Negative  Gastrointestinal: Negative    " Endocrine: Negative  Genitourinary: Negative  Musculoskeletal: Negative  Skin: Negative  Allergic/Immunologic: Negative  Neurological: Negative  Hematological: Negative  Psychiatric/Behavioral: Negative  All other systems reviewed and are negative  Historical Information     Immunizations:   Immunization History   Administered Date(s) Administered   • COVID-19 MODERNA VACC 0 5 ML IM 01/26/2021, 03/01/2021   • Tdap 03/30/2023       Past Medical History:   Diagnosis Date   • Hypercholesteremia    • Hypertension        Family History   Problem Relation Age of Onset   • Cancer Mother    • Cervical cancer Mother    • No Known Problems Sister    • No Known Problems Maternal Grandmother    • No Known Problems Paternal Grandmother    • No Known Problems Sister    • No Known Problems Maternal Aunt    • No Known Problems Maternal Aunt    • Breast cancer Paternal Aunt      Past Surgical History:   Procedure Laterality Date   • HYSTERECTOMY     • OOPHORECTOMY       Social History     Tobacco Use   • Smoking status: Never   • Smokeless tobacco: Never   Vaping Use   • Vaping Use: Never used   Substance Use Topics   • Alcohol use: Yes   • Drug use: Never     E-Cigarette/Vaping   • E-Cigarette Use Never User      E-Cigarette/Vaping Substances   • Nicotine No    • THC No    • CBD No    • Flavoring No    • Other No    • Unknown No        Family History: non-contributory    Meds/Allergies   Prior to Admission Medications   Prescriptions Last Dose Informant Patient Reported? Taking?    BYSTOLIC 5 MG tablet   Yes No   simvastatin (ZOCOR) 10 mg tablet   Yes No   Sig: Take 10 mg by mouth daily at bedtime      Facility-Administered Medications: None       No Known Allergies    PHYSICAL EXAM    PE limited by:     Objective   Vitals:   First set: Temperature: 98 °F (36 7 °C) (03/30/23 1237)  Pulse: 70 (03/30/23 1235)  Respirations: 18 (03/30/23 1235)  Blood Pressure: (!) 206/113 (03/30/23 1237)  SpO2: 96 % (03/30/23 1968)    Primary Survey:   (A) Airway: patent  (B) Breathing: lungs CTA b/l  (C) Circulation: Pulses:   normal  (D) Disabliity:  GCS Total:  14--stated April for month   (E) Expose:  Completed    Portable chest xray not obtained as there was no chest wall tenderness    Secondary Survey: (Click on Physical Exam tab above)  Physical Exam  Vitals and nursing note reviewed  Constitutional:       General: She is not in acute distress  Appearance: Normal appearance  She is normal weight  She is not ill-appearing or toxic-appearing  HENT:      Head: Normocephalic  Right Ear: External ear normal       Left Ear: External ear normal       Nose: Nose normal       Mouth/Throat:      Mouth: Mucous membranes are moist    Eyes:      Extraocular Movements: Extraocular movements intact  Pupils: Pupils are equal, round, and reactive to light  Cardiovascular:      Rate and Rhythm: Normal rate and regular rhythm  Pulses: Normal pulses  Heart sounds: Normal heart sounds  Pulmonary:      Effort: Pulmonary effort is normal       Breath sounds: Normal breath sounds  Abdominal:      General: Abdomen is flat  Bowel sounds are normal  There is no distension  Palpations: Abdomen is soft  Tenderness: There is no abdominal tenderness  There is no right CVA tenderness or guarding  Musculoskeletal:      Cervical back: Normal range of motion and neck supple  Skin:     General: Skin is warm  Capillary Refill: Capillary refill takes less than 2 seconds  Neurological:      General: No focal deficit present  Mental Status: She is alert  GCS: GCS eye subscore is 4  GCS verbal subscore is 4  GCS motor subscore is 6  Cranial Nerves: Cranial nerves 2-12 are intact  No cranial nerve deficit or dysarthria  Sensory: Sensation is intact  No sensory deficit  Motor: Motor function is intact  Coordination: Coordination is intact        Comments: 5/5 upper extremity strength, no numbness or tingling   5/5 lower extremity strength, no numbness or tingling    Review of records shows recent work up for memory loss,  reports patient at baseline   Psychiatric:         Mood and Affect: Mood normal          Behavior: Behavior normal          Thought Content: Thought content normal          Cervical spine cleared by clinical criteria?  No (imaging required)      Invasive Devices     None                 Lab Results:   Results Reviewed     Procedure Component Value Units Date/Time    HS Troponin I 2hr [[id number]]  (Normal) Collected: 03/30/23 1510    Lab Status: Final result Specimen: Blood from Arm, Right Updated: 03/30/23 1538     hs TnI 2hr 10 ng/L      Delta 2hr hsTnI 0 ng/L     HS Troponin 0hr (reflex protocol) [[id number]]  (Normal) Collected: 03/30/23 1310    Lab Status: Final result Specimen: Blood from Arm, Right Updated: 03/30/23 1344     hs TnI 0hr 10 ng/L     Magnesium [[id number]]  (Normal) Collected: 03/30/23 1310    Lab Status: Final result Specimen: Blood from Arm, Right Updated: 03/30/23 1337     Magnesium 2 1 mg/dL     Comprehensive metabolic panel [[id number]] Collected: 03/30/23 1310    Lab Status: Final result Specimen: Blood from Arm, Right Updated: 03/30/23 1337     Sodium 138 mmol/L      Potassium 4 0 mmol/L      Chloride 103 mmol/L      CO2 26 mmol/L      ANION GAP 9 mmol/L      BUN 18 mg/dL      Creatinine 1 12 mg/dL      Glucose 95 mg/dL      Calcium 10 0 mg/dL      AST 22 U/L      ALT 16 U/L      Alkaline Phosphatase 50 U/L      Total Protein 7 2 g/dL      Albumin 4 5 g/dL      Total Bilirubin 0 84 mg/dL      eGFR 47 ml/min/1 73sq m     Narrative:      [facility] guidelines for Chronic Kidney Disease (CKD):   •  Stage 1 with normal or high GFR (GFR > 90 mL/min/1 73 square meters)  •  Stage 2 Mild CKD (GFR = 60-89 mL/min/1 73 square meters)  •  Stage 3A Moderate CKD (GFR = 45-59 mL/min/1 73 square meters)  •  Stage 3B Moderate CKD (GFR = 30-44 mL/min/1 73 square meters)  •  Stage 4 Severe CKD (GFR = 15-29 mL/min/1 73 square meters)  •  Stage 5 End Stage CKD (GFR <15 mL/min/1 73 square meters)  Note: GFR calculation is accurate only with a steady state creatinine    CBC and differential [[id number]]  (Abnormal) Collected: 03/30/23 1310    Lab Status: Final result Specimen: Blood from Arm, Right Updated: 03/30/23 1316     WBC 7 15 Thousand/uL      RBC 4 41 Million/uL      Hemoglobin 13 8 g/dL      Hematocrit 42 1 %      MCV 96 fL      MCH 31 3 pg      MCHC 32 8 g/dL      RDW 13 2 %      MPV 10 4 fL      Platelets 787 Thousands/uL      nRBC 0 /100 WBCs      Neutrophils Relative 76 %      Immat GRANS % 1 %      Lymphocytes Relative 17 %      Monocytes Relative 6 %      Eosinophils Relative 0 %      Basophils Relative 0 %      Neutrophils Absolute 5 42 Thousands/µL      Immature Grans Absolute 0 07 Thousand/uL      Lymphocytes Absolute 1 20 Thousands/µL      Monocytes Absolute 0 41 Thousand/µL      Eosinophils Absolute 0 03 Thousand/µL      Basophils Absolute 0 02 Thousands/µL                  Imaging Studies:   Direct to CT: Yes  CT facial bones without contrast   Final Result by Nan Church DO (03/30 1359)   1  No evidence of acute facial bone fracture  The study was marked in [facility] for immediate notification per trauma evaluation protocol  Workstation performed: [id number]         TRAUMA - CT spine cervical wo contrast   Final Result by Nan Church DO (03/30 1350)   1  No evidence of acute cervical fracture  There is minimal anterolisthesis of C4 on C5 and C7 on T1, favored to be degenerative in etiology  Cervical facets demonstrate appropriate alignment  2   Moderate cervical degenerative changes  The study was marked in [facility] for immediate notification per trauma evaluation protocol        Workstation performed: [id number]         TRAUMA - CT head wo contrast   Final Result by Nima Partida DO Deann (03/30 1409)   Addendum (preliminary) 1 of 1 by Koko Whyte DO (03/30 1409)   ADDENDUM:      There is laterality error in the body the report  Mild soft tissue    swelling involves the left superolateral soft tissues, not the right  Final   1  No evidence of acute intracranial abnormality  2   Scattered areas of hypoattenuation within the periventricular and subcortical white matter as can be seen in the setting of chronic microangiopathy  The study was marked in [facility] for immediate notification per trauma evaluation protocol  Workstation performed: [id number]         X-ray chest 1 view portable   ED Interpretation by RICO Lim (03/30 1402)   No acute cardiopulmonary disease  Final Result by Koko Whyte DO (03/30 1507)   1  No evidence of acute cardiopulmonary process  2   Round densities within the medial right lung base which are favored represent prominent pulmonary vessels en face versus small granulomas  Short-term follow-up with dual-energy chest x-ray is recommended on a routine basis        Findings were relayed to Liang Ribeiro via [employer] messaging on 3/30/2023 at 3:00 PM                            Workstation performed: [id number]               Procedures  POC FAST US    Date/Time: 3/30/2023 12:42 PM  Performed by: Alok Lim  Authorized by: RICO Lim     Patient location:  ED  Procedure details:     Exam Type:  Diagnostic    Technique: FAST      Views obtained:  Heart - Pericardial sac, RUQ - Rolon's Pouch, LUQ - Splenorenal space and Suprapubic - Pouch of Hitesh    Image quality: diagnostic    FAST Findings:     RUQ (Hepatorenal) free fluid: absent      LUQ (Splenorenal) free fluid: absent      Suprapubic free fluid: absent      Cardiac wall motion: identified      Pericardial effusion: absent    Interpretation:     Impressions: negative    Laceration repair    Date/Time: 3/30/2023 1:56 PM  Performed by: RICO Juarez  Authorized by: RICO Juarez   Consent: Verbal consent obtained    Risks and benefits: risks, benefits and alternatives were discussed  Consent given by: patient  Patient identity confirmed: verbally with patient  Body area: head/neck  Location details: forehead  Laceration length: 1 cm  Tendon involvement: none  Nerve involvement: none  Vascular damage: no    Wound Dehiscence:  Superficial Wound Dehiscence: simple closure      Procedure Details:  Irrigation solution: saline  Irrigation method: jet lavage and syringe  Amount of cleaning: standard  Debridement: none  Degree of undermining: none  Skin closure: glue  Approximation: close  Approximation difficulty: simple  Patient tolerance: patient tolerated the procedure well with no immediate complications    ECG 12 Lead Documentation Only    Date/Time: 3/30/2023 1:26 PM  Performed by: RICO Juarez  Authorized by: RICO Juarez     Indications / Diagnosis:  Lightheaded and fall  ECG reviewed by me, the ED Provider: yes    Patient location:  ED  Interpretation:     Interpretation: normal    Rate:     ECG rate:  59    ECG rate assessment: normal    Rhythm:     Rhythm: sinus rhythm    Ectopy:     Ectopy: none    QRS:     QRS axis:  Normal  Conduction:     Conduction: normal    ST segments:     ST segments:  Normal  T waves:     T waves: normal    ECG 12 Lead Documentation Only    Date/Time: 3/30/2023 3:18 PM  Performed by: RICO Juarez  Authorized by: RICO Juarez     Indications / Diagnosis:  Lightheaded repeat   ECG reviewed by me, the ED Provider: yes    Patient location:  ED  Interpretation:     Interpretation: normal    Rate:     ECG rate:  65    ECG rate assessment: normal    Rhythm:     Rhythm: sinus rhythm    Ectopy:     Ectopy: none    QRS:     QRS axis:  Normal  Conduction:     Conduction: normal    ST segments:     ST segments:  Normal  T waves:     T waves: normal "             ED Course  ED Course as of 03/30/23 1824   Thu Mar 30, 2023   1337 Reports unsure if took her BP medication today  Will provide dose  1349 hs TnI 0hr: 10   1354 Pt's  now stating patient did take her blood pressure medication  Patient denies any chest pain, shortness of breath, dizziness, lightheadedness, nausea, vomiting, blurred or double vision at this time  1401 CT head, neck, face are negative  1402 hs TnI 0hr: 10   1508 Updated xray findings for official read: On X-ray for Jacques there is nothing acute  Small densities in right base may be granulomas  Would do a short term follow up dual energy X-ray as outpatient   4[facility] 2hr hsTnI: 0  With a delta of 0, no further troponins required  Patient's EKG at baseline  Per [facility] ACS algorithm, no further troponins required  ACS ruled out  1546 Reviewed work up with patient today  Patient states she is ready for discharge  Medical Decision Making  DDx: laceration, fracture, intercranial hemorrhage, electrolyte disturbance, ACS, dysrhythmia  Due to lightheadedness will treat as a potential anginal equivalent  Will CT  Head and neck due to fall with laceration and hematoma over left eye  Due to swelling of left forehead, will check CT facial bones to rule out facial fracture   was present for this event, denies any syncope, near syncope  Patient denies chest pain, shortness of breath, dizziness, nausea/vomiting, abdominal pain prior to or after, only citing \"lightheaded,\" and then fell forward  Patient's  endorses that patient lost her balance and fell forward striking her head, denies patient having LOC, having witnessed entire event  Patient initially states month of April, however, review of records shows patient being worked up for memory loss in the outpatient setting,  reports at baseline  Patient initially seen she is unsure if she took blood pressure medication today    Patient's " " states that he is \"pretty sure she took it  \"  Due to uncertainty we will treat with hydralazine  Patient is asymptomatic during episode of hypertension, with no chest pain no shortness of breath no dizziness, no lightheadedness, no nausea no vomiting no blurred or double vision  Patient had 2 troponins with a delta of 0  Patient has had a normal sinus rhythm  Patient has had no acute telemetry events, blood pressure improved after small dose of hydralazine  Patient aware to follow-up with PCP for continued blood pressure monitoring  Patient continues to be asymptomatic with no chest pain, no shortness of breath, no dizziness, no lightheadedness  Patient reports has been at her baseline  Patient's blood pressure did not require any further pharmacologic intervention  Discussed with patient that she needs to follow-up with her PCP for continued blood pressure monitoring, and potential pharmacologic management of her current regimen  Heart score of 1; ACS ruled out per [facility] algorithm  Patient is stable for discharge  Patient states she is ready for discharge to return home  Patient reports comfortable with discharge, as well as   Reviewed care of skin glue closure care  Aware of strict return precautions to ED  Aware to follow up with PCP  Cardiology referral placed due to reports of earlier \"Lightheadedness  \" Cardiology phone number provided  Vital signs stable at time of discharge  Reviewed reasons to return to ed  Patient verbalized understanding of diagnosis and agreement with discharge plan of care as well as understanding of reasons to return to ed  Facial laceration, initial encounter: acute illness or injury  Fall, initial encounter: acute illness or injury  Injury of head, initial encounter: acute illness or injury  Need for tetanus booster: acute illness or injury  Amount and/or Complexity of Data Reviewed  Labs: ordered   Decision-making details documented in ED " Course  Radiology: ordered and independent interpretation performed  Risk  Prescription drug management  Disposition  [facility] Transfer: No  Final diagnoses:   Fall, initial encounter   Facial laceration, initial encounter   Need for tetanus booster   Lightheaded   Injury of head, initial encounter     Time reflects when diagnosis was documented in both MDM as applicable and the Disposition within this note     Time User Action Codes Description Comment    3/30/2023  3:31 PM Medhat Nones Add [L53  LFVZ] Fall, initial encounter     3/30/2023  3:39 PM Medhat Nones Add [S01 81XA] Facial laceration, initial encounter     3/30/2023  3:39 PM Medhat Nones Add [Z23] Need for tetanus booster     3/30/2023  3:39 PM Medhat Nones Add [R42] Lightheaded     3/30/2023  3:46 PM Medhat Nones Add [S09 90XA] Injury of head, initial encounter       ED Disposition     ED Disposition   Discharge    Condition   Stable    Date/Time   Thu Mar 30, 2023  3:53 PM    Comment   Sheryl Bellamy discharge to home/self care  Follow-up Information     Follow up With Specialties Details Why Contact Info Additional Information    Roberto Napier MD Family Medicine  For follow up chest xray  Without proper follow up diagnosis such as but not limited to cancer can be missed   [address] [facility] Schedule an appointment as soon as possible for a visit  For further evaluation of symptoms [address] [facility] [id number]  [address] [place] [address] Cardiology [address] [place], [place], [address], [address] [facility] Emergency Department Emergency Medicine Schedule an appointment as soon as possible for a visit in 2 days For further evaluation of symptoms [address] [place] Other [id number] [phone] [facility] [place] [address] Emergency Department, [address] [place], [place], [address] [facility]        Discharge Medication List as of 3/30/2023  3:53 PM      CONTINUE these medications which have NOT CHANGED    Details   BYSTOLIC 5 MG tablet Starting Mon 3/11/2019, Historical Med      simvastatin (ZOCOR) 10 mg tablet Take 10 mg by mouth daily at bedtime, Historical Med               PDMP Review     None          ED Provider  Electronically Signed by         Gilford Baptist, CRNP  03/30/23 6313

## 2023-03-31 LAB
ATRIAL RATE: 59 BPM
ATRIAL RATE: 65 BPM
P AXIS: 11 DEGREES
P AXIS: 59 DEGREES
PR INTERVAL: 162 MS
PR INTERVAL: 166 MS
QRS AXIS: 1 DEGREES
QRS AXIS: 21 DEGREES
QRSD INTERVAL: 72 MS
QRSD INTERVAL: 74 MS
QT INTERVAL: 430 MS
QT INTERVAL: 434 MS
QTC INTERVAL: 429 MS
QTC INTERVAL: 447 MS
T WAVE AXIS: 25 DEGREES
T WAVE AXIS: 30 DEGREES
VENTRICULAR RATE: 59 BPM
VENTRICULAR RATE: 65 BPM

## 2023-05-25 ENCOUNTER — OFFICE VISIT (OUTPATIENT)
Dept: CARDIOLOGY CLINIC | Facility: CLINIC | Age: 78
End: 2023-05-25

## 2023-05-25 VITALS
HEIGHT: 64 IN | DIASTOLIC BLOOD PRESSURE: 110 MMHG | HEART RATE: 84 BPM | WEIGHT: 154 LBS | SYSTOLIC BLOOD PRESSURE: 180 MMHG | BODY MASS INDEX: 26.29 KG/M2

## 2023-05-25 DIAGNOSIS — E78.5 HYPERLIPIDEMIA, UNSPECIFIED HYPERLIPIDEMIA TYPE: ICD-10-CM

## 2023-05-25 DIAGNOSIS — I10 PRIMARY HYPERTENSION: Primary | ICD-10-CM

## 2023-05-25 DIAGNOSIS — R42 LIGHTHEADED: ICD-10-CM

## 2023-05-25 DIAGNOSIS — R06.83 SNORING: ICD-10-CM

## 2023-05-25 DIAGNOSIS — R42 DIZZINESS: ICD-10-CM

## 2023-05-25 RX ORDER — LISINOPRIL 2.5 MG/1
2.5 TABLET ORAL DAILY
Qty: 30 TABLET | Refills: 3 | Status: SHIPPED | OUTPATIENT
Start: 2023-05-25

## 2023-06-05 ENCOUNTER — HOSPITAL ENCOUNTER (OUTPATIENT)
Dept: NON INVASIVE DIAGNOSTICS | Facility: CLINIC | Age: 78
Discharge: HOME | End: 2023-06-05
Payer: COMMERCIAL

## 2023-06-05 DIAGNOSIS — R42 LIGHTHEADED: ICD-10-CM

## 2023-06-05 DIAGNOSIS — R42 DIZZINESS: ICD-10-CM

## 2023-06-05 PROCEDURE — 93225 XTRNL ECG REC<48 HRS REC: CPT

## 2023-06-05 PROCEDURE — 93226 XTRNL ECG REC<48 HR SCAN A/R: CPT

## 2023-06-09 ENCOUNTER — TELEPHONE (OUTPATIENT)
Dept: CARDIOLOGY CLINIC | Facility: CLINIC | Age: 78
End: 2023-06-09

## 2023-06-09 PROCEDURE — 93227 XTRNL ECG REC<48 HR R&I: CPT | Performed by: INTERNAL MEDICINE

## 2023-06-09 NOTE — TELEPHONE ENCOUNTER
----- Message from Vandana Bates DO sent at 6/9/2023 12:36 PM EDT -----  Please call the patient let her know Holter monitor showed predominantly sinus rhythm and we will go over results in more detail at next office visit

## 2023-06-26 ENCOUNTER — TELEPHONE (OUTPATIENT)
Dept: CARDIOLOGY CLINIC | Facility: CLINIC | Age: 78
End: 2023-06-26

## 2023-06-26 ENCOUNTER — HOSPITAL ENCOUNTER (OUTPATIENT)
Dept: NON INVASIVE DIAGNOSTICS | Facility: HOSPITAL | Age: 78
Discharge: HOME | End: 2023-06-26
Payer: COMMERCIAL

## 2023-06-26 VITALS
SYSTOLIC BLOOD PRESSURE: 118 MMHG | DIASTOLIC BLOOD PRESSURE: 86 MMHG | HEIGHT: 64 IN | HEART RATE: 69 BPM | WEIGHT: 154 LBS | BODY MASS INDEX: 26.29 KG/M2

## 2023-06-26 DIAGNOSIS — I65.23 BILATERAL CAROTID ARTERY STENOSIS: ICD-10-CM

## 2023-06-26 DIAGNOSIS — R42 DIZZINESS: ICD-10-CM

## 2023-06-26 DIAGNOSIS — R42 LIGHTHEADED: ICD-10-CM

## 2023-06-26 DIAGNOSIS — E78.5 HYPERLIPIDEMIA, UNSPECIFIED HYPERLIPIDEMIA TYPE: Primary | ICD-10-CM

## 2023-06-26 DIAGNOSIS — I10 PRIMARY HYPERTENSION: ICD-10-CM

## 2023-06-26 LAB
AORTIC ROOT: 3.7 CM
AORTIC VALVE MEAN VELOCITY: 4.7 M/S
APICAL FOUR CHAMBER EJECTION FRACTION: 44 %
ASCENDING AORTA: 3.6 CM
AV AREA BY CONTINUOUS VTI: 2.8 CM2
AV AREA PEAK VELOCITY: 2.8 CM2
AV LVOT MEAN GRADIENT: 1 MMHG
AV LVOT PEAK GRADIENT: 2 MMHG
AV MEAN GRADIENT: 1 MMHG
AV PEAK GRADIENT: 2 MMHG
AV VALVE AREA: 2.77 CM2
AV VELOCITY RATIO: 0.97
DOP CALC AO PEAK VEL: 0.68 M/S
DOP CALC AO VTI: 13.36 CM
DOP CALC LVOT AREA: 2.83 CM2
DOP CALC LVOT CARDIAC INDEX: 1.43 L/MIN/M2
DOP CALC LVOT CARDIAC OUTPUT: 2.5 L/MIN
DOP CALC LVOT DIAMETER: 1.9 CM
DOP CALC LVOT PEAK VEL VTI: 13.06 CM
DOP CALC LVOT PEAK VEL: 0.66 M/S
DOP CALC LVOT STROKE INDEX: 20.6 ML/M2
DOP CALC LVOT STROKE VOLUME: 37.01
E WAVE DECELERATION TIME: 131 MS
FRACTIONAL SHORTENING: 30 (ref 28–44)
INTERVENTRICULAR SEPTUM IN DIASTOLE (PARASTERNAL SHORT AXIS VIEW): 1.5 CM
INTERVENTRICULAR SEPTUM: 1.5 CM (ref 0.6–1.1)
LAAS-AP2: 12.2 CM2
LAAS-AP4: 12.3 CM2
LEFT ATRIUM SIZE: 2.4 CM
LEFT ATRIUM VOLUME (MOD BIPLANE): 29 ML
LEFT INTERNAL DIMENSION IN SYSTOLE: 2.8 CM (ref 2.1–4)
LEFT VENTRICULAR INTERNAL DIMENSION IN DIASTOLE: 4 CM (ref 3.5–6)
LEFT VENTRICULAR POSTERIOR WALL IN END DIASTOLE: 1.2 CM
LEFT VENTRICULAR STROKE VOLUME: 38 ML
LVSV (TEICH): 38 ML
MV PEAK A VEL: 1.05 M/S
MV PEAK E VEL: 49 CM/S
MV STENOSIS PRESSURE HALF TIME: 38 MS
MV VALVE AREA P 1/2 METHOD: 5.79
RIGHT VENTRICLE ID DIMENSION: 2.8 CM
SINOTUBULAR JUNCTION: 2.8 CM
SL CV LEFT ATRIUM LENGTH A2C: 4.1 CM
SL CV LV EF: 55
SL CV PED ECHO LEFT VENTRICLE DIASTOLIC VOLUME (MOD BIPLANE) 2D: 69 ML
SL CV PED ECHO LEFT VENTRICLE SYSTOLIC VOLUME (MOD BIPLANE) 2D: 30 ML
SL CV SINUS OF VALSALVA 2D: 3.6 CM
STJ: 2.8 CM
TR MAX PG: 22 MMHG
TR PEAK VELOCITY: 2.3 M/S
TRICUSPID ANNULAR PLANE SYSTOLIC EXCURSION: 1.5 CM
TRICUSPID VALVE PEAK REGURGITATION VELOCITY: 2.33 M/S

## 2023-06-26 PROCEDURE — 93880 EXTRACRANIAL BILAT STUDY: CPT

## 2023-06-26 PROCEDURE — 93306 TTE W/DOPPLER COMPLETE: CPT | Performed by: INTERNAL MEDICINE

## 2023-06-26 PROCEDURE — 93306 TTE W/DOPPLER COMPLETE: CPT

## 2023-06-26 PROCEDURE — 93880 EXTRACRANIAL BILAT STUDY: CPT | Performed by: SURGERY

## 2023-06-26 RX ORDER — ATORVASTATIN CALCIUM 20 MG/1
20 TABLET, FILM COATED ORAL DAILY
Qty: 30 TABLET | Refills: 6 | Status: SHIPPED | OUTPATIENT
Start: 2023-06-26

## 2023-06-26 NOTE — TELEPHONE ENCOUNTER
I was able to call and speak with patient about transthoracic echocardiogram results as well as carotid Doppler results  As carotid Doppler did show some heterogeneous irregularity and plaque with less than 50% stenosis will uptitrate patient from simvastatin 10 mg daily to atorvastatin 20 mg daily and see how she tolerates this with further up titration if she tolerates  I have also given referral to vascular team for continued monitoring and will have office staff assist in setting this up  Patient notes overall doing well and I will see her as scheduled or sooner if necessary

## 2023-06-27 ENCOUNTER — TELEPHONE (OUTPATIENT)
Dept: VASCULAR SURGERY | Facility: CLINIC | Age: 78
End: 2023-06-27

## 2023-07-06 ENCOUNTER — HOSPITAL ENCOUNTER (OUTPATIENT)
Dept: MAMMOGRAPHY | Facility: HOSPITAL | Age: 78
End: 2023-07-06
Payer: COMMERCIAL

## 2023-07-06 VITALS — HEIGHT: 64 IN | WEIGHT: 140 LBS | BODY MASS INDEX: 23.9 KG/M2

## 2023-07-06 DIAGNOSIS — Z12.31 VISIT FOR SCREENING MAMMOGRAM: ICD-10-CM

## 2023-07-06 PROCEDURE — 77063 BREAST TOMOSYNTHESIS BI: CPT

## 2023-07-06 PROCEDURE — 77067 SCR MAMMO BI INCL CAD: CPT

## 2023-07-07 DIAGNOSIS — I10 PRIMARY HYPERTENSION: ICD-10-CM

## 2023-07-07 RX ORDER — LISINOPRIL 2.5 MG/1
TABLET ORAL
Qty: 90 TABLET | Refills: 3 | Status: SHIPPED | OUTPATIENT
Start: 2023-07-07

## 2023-07-13 ENCOUNTER — OFFICE VISIT (OUTPATIENT)
Dept: CARDIOLOGY CLINIC | Facility: CLINIC | Age: 78
End: 2023-07-13
Payer: COMMERCIAL

## 2023-07-13 VITALS
HEIGHT: 64 IN | HEART RATE: 64 BPM | SYSTOLIC BLOOD PRESSURE: 120 MMHG | WEIGHT: 150 LBS | DIASTOLIC BLOOD PRESSURE: 80 MMHG | BODY MASS INDEX: 25.61 KG/M2

## 2023-07-13 DIAGNOSIS — R06.83 SNORING: ICD-10-CM

## 2023-07-13 DIAGNOSIS — R42 DIZZINESS: ICD-10-CM

## 2023-07-13 DIAGNOSIS — I10 PRIMARY HYPERTENSION: Primary | ICD-10-CM

## 2023-07-13 DIAGNOSIS — E78.5 HYPERLIPIDEMIA, UNSPECIFIED HYPERLIPIDEMIA TYPE: ICD-10-CM

## 2023-07-13 PROCEDURE — 99214 OFFICE O/P EST MOD 30 MIN: CPT | Performed by: INTERNAL MEDICINE

## 2023-07-13 NOTE — PROGRESS NOTES
Cardiology Follow up    Sergio Garcia  [id number]  1945  [facility] [address] [address]      1. Primary hypertension  Basic metabolic panel      2. Hyperlipidemia, unspecified hyperlipidemia type  Lipid Panel with Direct LDL reflex      3. Snoring        4. Dizziness            Discussion/Summary:  1. Hypertension  2. Lightheadedness/dizziness-improving  3. Hyperlipidemia  4. Bilateral carotid stenosis  5. Snoring    -Carotid Dopplers 6/26/2023 showing less than 50% bilateral carotid stenosis and patient has upcoming appoint with vascular team for monitoring.  -Transthoracic echocardiogram 6/26/2023 showing left ventricular systolic function normal submitted LVEF 55% with grade 1 diastolic function, mild tricuspid regurgitation with normal right ventricular systolic pressure and aortic root measuring 3.7 cm diameter.  -Holter monitor 6/5/2023 showing predominantly sinus rhythm average heart rate 71 bpm with rare ectopic activity but no significant arrhythmias appreciated.   -Patient counseled on dietary and lifestyle modifications including following a low-salt, low-fat, heart healthy diet with sodium restriction to less than 1800 mg of sodium daily  -Patient counseled the importance of follow-up with sleep medicine for evaluation of possible obstructive sleep apnea  -Patient will continue atorvastatin 20 mg daily at this time and lisinopril 2.5 mg daily.  -We will repeat fasting lipid panel prior to next office visit along with BMP to monitor renal function.  -Patient will continue to monitor home blood pressure readings and if consistently elevated greater than 130s/80s mmHg will let our office know for up titration of medical therapy and may even need to be placed back on nebivolol as she did have lower blood pressures while on both medications.  -We will see patient in 6 months or sooner if necessary  -Patient counseled if she were to have any warning or alarm type symptoms she is to seek emergency medical care immediately. History of Present Illness:  -Patient is a 50-year-old female with hypertension, hyperlipidemia who was seen and evaluated [facility] [address] 3/30/2023 for dizziness and fall. At that time patient had been leaning over for an extended period of time over her garden bed and became lightheaded and fell. She did not have any loss of consciousness but did strike her head which caused a laceration to her left eyebrow and therefore presented to the emergency department for further evaluation. In the emergency department patient's blood pressure was significantly elevated 206/113 mmHg and underwent evaluation and was eventually able to be discharged to outpatient follow-up where she was seen in the office in May 2023.  -She presents to the office today for scheduled follow-up and denies any chest pain, palpitations, lightness or dizziness, loss conscious, shortness of breath, lower extremity edema, orthopnea or bendopnea. She notes her blood pressures at home are very well controlled and overall is feeling well. Past Medical History:   Diagnosis Date   • Hypercholesteremia    • Hypertension      Social History     Socioeconomic History   • Marital status: /Civil Union     Spouse name: Not on file   • Number of children: Not on file   • Years of education: Not on file   • Highest education level: Not on file   Occupational History   • Not on file   Tobacco Use   • Smoking status: Never   • Smokeless tobacco: Never   Vaping Use   • Vaping Use: Never used   Substance and Sexual Activity   • Alcohol use:  Yes   • Drug use: Never   • Sexual activity: Not on file   Other Topics Concern   • Not on file   Social History Narrative    Daily caffeine use- 2 cup of coffee, hot tea-1 cup on occasion     Social Determinants of Health     Financial Resource Strain: Not on file   Food Insecurity: Not on file   Transportation Needs: Not on file   Physical Activity: Not on file   Stress: Not on file   Social Connections: Not on file   Intimate Partner Violence: Not on file   Housing Stability: Not on file      Family History   Problem Relation Age of Onset   • Cancer Mother    • Cervical cancer Mother    • No Known Problems Sister    • No Known Problems Maternal Grandmother    • No Known Problems Paternal Grandmother    • No Known Problems Sister    • No Known Problems Maternal Aunt    • No Known Problems Maternal Aunt    • Breast cancer Paternal Aunt      Past Surgical History:   Procedure Laterality Date   • HYSTERECTOMY     • OOPHORECTOMY         Current Outpatient Medications:   •  atorvastatin (LIPITOR) 20 mg tablet, Take 1 tablet (20 mg total) by mouth daily, Disp: 30 tablet, Rfl: 6  •  lisinopril (ZESTRIL) 2.5 mg tablet, take 1 tablet by mouth once daily, Disp: 90 tablet, Rfl: 3  •  BYSTOLIC 5 MG tablet, Take 2.5 mg by mouth daily (Patient not taking: Reported on 7/13/2023), Disp: , Rfl:   No Known Allergies      Labs:  Hospital Outpatient Visit on 06/26/2023   Component Date Value   • A4C EF 06/26/2023 44    • LVOT stroke volume 06/26/2023 37.01    • LVOT stroke volume index 06/26/2023 20.60    • LVOT Cardiac Index 06/26/2023 1.43    • LVOT Cardiac Output 06/26/2023 2.50    • LVIDd 06/26/2023 4.00    • LVIDS 06/26/2023 2.80    • IVSd 06/26/2023 1.50    • LVPWd 06/26/2023 1.20    • FS 06/26/2023 30    • E wave deceleration time 06/26/2023 131    • MV Peak E Javier 06/26/2023 49    • MV Peak A Javier 06/26/2023 1.05    • AV LVOT peak gradient 06/26/2023 2    • LVOT peak VTI 06/26/2023 13.06    • LVOT peak javier 06/26/2023 0.66    • RVID d 06/26/2023 2.8    • Tricuspid annular plane * 06/26/2023 1.50    • LA size 06/26/2023 2.4    • LA length (A2C) 06/26/2023 4.10    • LA volume (BP) 06/26/2023 29    • LVOT diameter 06/26/2023 1. 9    • Aortic valve peak veloci* 06/26/2023 0.68    • Ao VTI 06/26/2023 13.36    • AV mean gradient 06/26/2023 1    • LVOT mn grad 06/26/2023 1.0    • AV peak gradient 06/26/2023 2    • AV area by cont VTI 06/26/2023 2.8    • AV area peak javier 06/26/2023 2.8    • MV stenosis pressure 1/2* 06/26/2023 38    • MV valve area p 1/2 meth* 06/26/2023 5.79    • TR Peak Javier 06/26/2023 2.3    • Triscuspid Valve Regurgi* 06/26/2023 22.0    • Ao root 06/26/2023 3.70    • STJ 06/26/2023 2.8    • Asc Ao 06/26/2023 3.6    • Sinus of Valsalva, 2D 06/26/2023 3.6    • Aortic valve mean veloci* 06/26/2023 4.70    • Tricuspid valve peak reg* 06/26/2023 2.33    • Left ventricular stroke * 06/26/2023 38.00    • Ao STJ 06/26/2023 2.80    • IVS 06/26/2023 1.5    • LEFT VENTRICLE SYSTOLIC * 55/37/9751 30    • LV DIASTOLIC VOLUME (MOD* 27/64/2672 69    • Left Atrium Area-systoli* 06/26/2023 12.3    • Left Atrium Area-systoli* 06/26/2023 12.2    • LVSV, 2D 06/26/2023 38    • LV EF 06/26/2023 55    • LVOT area 06/26/2023 2.83    • DVI 06/26/2023 0.97    • AV valve area 06/26/2023 2.77         Imaging: Mammo screening bilateral w 3d & cad    Result Date: 7/7/2023  Narrative: DIAGNOSIS: Visit for screening mammogram TECHNIQUE: Digital screening mammography was performed. Computer Aided Detection (CAD) analyzed all applicable images. COMPARISONS: Prior breast imaging dated: 05/16/2022, 05/13/2021, 12/13/2019, 06/11/2018, 05/04/2017, 03/07/2016, 03/16/2015, and 06/02/2010 RELEVANT HISTORY: Family Breast Cancer History: History of breast cancer in Paternal Aunt. Family Medical History: Family medical history includes breast cancer in paternal aunt. Personal History: No known relevant hormone history. Surgical history includes hysterectomy and oophorectomy. No known relevant medical history. The patient is scheduled in a reminder system for screening mammography. 8-10% of cancers will be missed on mammography.  Management of a palpable abnormality must be based on clinical grounds. Patients will be notified of their results via letter from our facility. Accredited by [employer] of Radiology and FDA. RISK ASSESSMENT: 5 Year Tyrer-Cuzick: 1.29 % 10 Year Tyrer-Cuzick: No Score Lifetime Tyrer-Cuzick: 1.84 % TISSUE DENSITY: The breasts are heterogeneously dense, which may obscure small masses. INDICATION: Enmanuel Murdock is a 65 y.o. female presenting for screening mammography. FINDINGS: There are no suspicious masses, grouped microcalcifications or areas of architectural distortion. The skin and nipple areolar complex are unremarkable. Impression: No mammographic evidence of malignancy. ASSESSMENT/BI-RADS CATEGORY: Left: 1 - Negative Right: 1 - Negative Overall: 1 - Negative RECOMMENDATION:      - Routine screening mammogram in 1 year for both breasts. Workstation ID: [id number]     VAS carotid complete study    Result Date: 6/26/2023  Narrative:  [facility] REPORT CLINICAL: Indications:  Patient presents with recent episodes of dizziness and falls. She is currently asymptomatic. Risk Factors The patient has history of HTN and Hyperlipidemia. Clinical Right Pressure:  118/86 mm Hg, Left Pressure:  122/88 mm Hg. FINDINGS:  Right        Impression  PSV  EDV (cm/s)  Direction of Flow  Ratio  Dist. ICA                 57          22                      1.01  Mid. ICA                  54          27                      0.95  Prox.  ICA    1 - 49%      35          18                      0.61  Dist CCA                  48          19                            Mid CCA                   57          17                      0.92  Prox CCA                  61          19                      1.83  Ext Carotid               63           8                      1.12  Prox Vert                 41          13  Antegrade                 Subclavian                70           0                            Innominate                34           8 Left         Impression  PSV  EDV (cm/s)  Direction of Flow  Ratio  Dist. ICA                 47          22                      1.09  Mid. ICA                  31          12                      0.71  Prox. ICA    1 - 49%      60          27                      1.37  Dist CCA                  40          15                            Mid CCA                   44          16                      0.71  Prox CCA                  62          19                            Ext Carotid               63          11                      1.45  Prox Vert                 32          11  Antegrade                 Subclavian                99           0                               CONCLUSION: Impression RIGHT: There is <50% stenosis noted in the internal carotid artery. Plaque is heterogenous and irregular. Vertebral artery flow is antegrade. There is no significant subclavian artery disease. LEFT: There is <50% stenosis noted in the internal carotid artery. Plaque is heterogenous and irregular. Vertebral artery flow is antegrade. There is no significant subclavian artery disease. No previous study for comparison. SIGNATURE: Electronically Signed by: Shavon Downs MD, [facility] on 2023-06-26 12:29:34 PM    Echo complete w/ contrast if indicated    Result Date: 6/26/2023  Narrative: •  Left Ventricle: Left ventricular cavity size is normal. There is mild concentric hypertrophy. The left ventricular ejection fraction is 55%. Systolic function is normal. Diastolic function is mildly abnormal, consistent with grade I (abnormal) relaxation. •  Tricuspid Valve: There is mild regurgitation. The right ventricular systolic pressure is normal. •  Aorta: The aortic root is trivially enlarged to 3.70 cm. Review of Systems:  Review of Systems   Constitutional: Negative for chills, diaphoresis, fatigue and fever. HENT: Negative for trouble swallowing and voice change. Eyes: Negative for pain and redness. Respiratory: Negative for shortness of breath and wheezing. Cardiovascular: Negative for chest pain, palpitations and leg swelling. Gastrointestinal: Negative for abdominal pain, constipation, diarrhea, nausea and vomiting. Genitourinary: Negative for dysuria. Musculoskeletal: Positive for arthralgias. Negative for neck pain and neck stiffness. Skin: Negative for rash. Neurological: Negative for dizziness, syncope, light-headedness and headaches. Psychiatric/Behavioral: Negative for agitation and confusion. All other systems reviewed and are negative. Vitals:    07/13/23 1408   BP: 120/80   Pulse: 64   Weight: 68 kg (150 lb)   Height: 5' 4" (1.626 m)     Vitals:    07/13/23 1408   Weight: 68 kg (150 lb)     Height: 5' 4" (162.6 cm)     Physical Exam:  Physical Exam  Vitals reviewed. Constitutional:       General: She is not in acute distress. Appearance: Normal appearance. She is not diaphoretic. HENT:      Head: Normocephalic and atraumatic. Eyes:      General:         Right eye: No discharge. Left eye: No discharge. Neck:      Comments: Trachea midline, no JVD present  Cardiovascular:      Rate and Rhythm: Normal rate and regular rhythm. Heart sounds: No friction rub. Pulmonary:      Effort: Pulmonary effort is normal. No respiratory distress. Breath sounds: No wheezing. Chest:      Chest wall: No tenderness. Abdominal:      General: Bowel sounds are normal.      Palpations: Abdomen is soft. Tenderness: There is no abdominal tenderness. There is no rebound. Musculoskeletal:      Right lower leg: No edema. Left lower leg: No edema. Skin:     General: Skin is warm and dry. Neurological:      Mental Status: She is alert. Comments: Awake, alert, able to answer questions appropriately, able to move extremities bilaterally.    Psychiatric:         Mood and Affect: Mood normal.         Behavior: Behavior normal.

## 2023-08-15 ENCOUNTER — CONSULT (OUTPATIENT)
Dept: VASCULAR SURGERY | Facility: CLINIC | Age: 78
End: 2023-08-15
Payer: COMMERCIAL

## 2023-08-15 VITALS
DIASTOLIC BLOOD PRESSURE: 82 MMHG | HEART RATE: 75 BPM | SYSTOLIC BLOOD PRESSURE: 122 MMHG | HEIGHT: 64 IN | OXYGEN SATURATION: 97 % | BODY MASS INDEX: 25.13 KG/M2 | TEMPERATURE: 97.1 F | WEIGHT: 147.2 LBS

## 2023-08-15 DIAGNOSIS — I65.23 ASYMPTOMATIC BILATERAL CAROTID ARTERY STENOSIS: Primary | ICD-10-CM

## 2023-08-15 DIAGNOSIS — I65.23 BILATERAL CAROTID ARTERY STENOSIS: ICD-10-CM

## 2023-08-15 PROBLEM — E78.5 HYPERLIPIDEMIA: Status: ACTIVE | Noted: 2023-08-15

## 2023-08-15 PROBLEM — I10 HYPERTENSION: Status: ACTIVE | Noted: 2023-08-15

## 2023-08-15 PROCEDURE — 99203 OFFICE O/P NEW LOW 30 MIN: CPT | Performed by: SURGERY

## 2023-08-15 RX ORDER — ASPIRIN 81 MG/1
81 TABLET, CHEWABLE ORAL DAILY
Start: 2023-08-15

## 2023-08-15 NOTE — PROGRESS NOTES
Assessment/Plan:    Pt is a 66 yo F w/ HTN, HLD, asymptomatic bilateral carotid stenosis    Asymptomatic bilateral carotid artery stenosis  -     Ambulatory referral to Vascular Surgery  -     aspirin 81 mg chewable tablet; Chew 1 tablet (81 mg total) daily  -had dizziness and a fall from bending in the garden  -carotid DU was ordered; reviewed, showing B ICA <50% stenosis  -this is not the cause of her dizziness/fall  -will continue surveillance on a 2 year basis  -f/u 2 years    Medications  -cont statin  -will add aspirin 81mg once daily for carotid stenosis        Subjective:      Patient ID: Marin Baldwin is a 65 y.o. female. New Patient referred by PCP DR. Reynaldo Rand for bilateral carotid stenosis had CV done 6/26/23. Pt reports dizziness. Lightness and had a recent fall in June 2023 related to dizziness, denies headaches, speech difficulty or weakness. Pt is taking Atorvastatin. Pt is a non smoker. HPI:    Pt referred for carotid stenosis. Back in March, patient was gardening and was bent over and got light-headed and fell. She had a facial lac but otherwise ok. Cards eval was negative for arrhythmia. Reports no other episodes. Denies hx of of stroke. Denies amaurosis, facial droop, altered speech, unilateral weakness/numbness. Takes lipitor. Does not take any blood thinners/antiplatelets. Never smoker. The following portions of the patient's history were reviewed and updated as appropriate: allergies, current medications, past family history, past medical history, past social history, past surgical history and problem list.    Review of Systems   Constitutional: Negative. HENT: Negative. Eyes: Negative. Negative for visual disturbance. Respiratory: Negative. Negative for shortness of breath. Cardiovascular: Negative. Negative for chest pain. Gastrointestinal: Negative. Endocrine: Negative. Genitourinary: Negative. Musculoskeletal: Negative. Negative for gait problem. Skin: Negative. Negative for wound. Allergic/Immunologic: Negative. Neurological: Positive for dizziness and light-headedness. Negative for facial asymmetry, speech difficulty, weakness and numbness. Hematological: Bruises/bleeds easily. Psychiatric/Behavioral: Positive for confusion (mild). Objective:      /82 (BP Location: Left arm, Patient Position: Sitting, Cuff Size: Standard)   Pulse 75   Temp (!) 97.1 °F (36.2 °C) (Temporal)   Ht 5' 4" (1.626 m)   Wt 66.8 kg (147 lb 3.2 oz)   SpO2 97%   BMI 25.27 kg/m²          Physical Exam  Cardiovascular:      Rate and Rhythm: Normal rate and regular rhythm. Pulses:           Radial pulses are 2+ on the right side and 2+ on the left side. Dorsalis pedis pulses are 2+ on the right side and 2+ on the left side. Posterior tibial pulses are 0 on the right side and 0 on the left side. Heart sounds: No murmur heard. Comments: No carotid bruits B  Pulmonary:      Effort: No respiratory distress. Breath sounds: No wheezing or rales. Musculoskeletal:      Right lower leg: No edema. Left lower leg: No edema. Skin:     Comments: No wounds           I have reviewed and made appropriate changes to the review of systems input by the medical assistant. Vitals:    08/15/23 0824 08/15/23 0826   BP: 124/84 122/82   BP Location: Right arm Left arm   Patient Position: Sitting Sitting   Cuff Size: Standard Standard   Pulse: 75    Temp: (!) 97.1 °F (36.2 °C)    TempSrc: Temporal    SpO2: 97%    Weight: 66.8 kg (147 lb 3.2 oz)    Height: 5' 4" (1.626 m)        There is no problem list on file for this patient.       Past Surgical History:   Procedure Laterality Date   • HYSTERECTOMY     • OOPHORECTOMY         Family History   Problem Relation Age of Onset   • Cancer Mother    • Cervical cancer Mother    • No Known Problems Sister    • No Known Problems Maternal Grandmother    • No Known Problems Paternal Grandmother    • No Known Problems Sister    • No Known Problems Maternal Aunt    • No Known Problems Maternal Aunt    • Breast cancer Paternal Aunt        Social History     Socioeconomic History   • Marital status: /Civil Union     Spouse name: Not on file   • Number of children: Not on file   • Years of education: Not on file   • Highest education level: Not on file   Occupational History   • Not on file   Tobacco Use   • Smoking status: Never   • Smokeless tobacco: Never   Vaping Use   • Vaping Use: Never used   Substance and Sexual Activity   • Alcohol use:  Yes   • Drug use: Never   • Sexual activity: Not on file   Other Topics Concern   • Not on file   Social History Narrative    Daily caffeine use- 2 cup of coffee, hot tea-1 cup on occasion     Social Determinants of Health     Financial Resource Strain: Not on file   Food Insecurity: Not on file   Transportation Needs: Not on file   Physical Activity: Not on file   Stress: Not on file   Social Connections: Not on file   Intimate Partner Violence: Not on file   Housing Stability: Not on file       No Known Allergies      Current Outpatient Medications:   •  atorvastatin (LIPITOR) 20 mg tablet, Take 1 tablet (20 mg total) by mouth daily, Disp: 30 tablet, Rfl: 6  •  lisinopril (ZESTRIL) 2.5 mg tablet, take 1 tablet by mouth once daily, Disp: 90 tablet, Rfl: 3  •  BYSTOLIC 5 MG tablet, Take 2.5 mg by mouth daily (Patient not taking: Reported on 7/13/2023), Disp: , Rfl:

## 2023-10-02 DIAGNOSIS — I10 PRIMARY HYPERTENSION: ICD-10-CM

## 2023-10-02 RX ORDER — LISINOPRIL 2.5 MG/1
2.5 TABLET ORAL DAILY
Qty: 30 TABLET | Refills: 5 | Status: SHIPPED | OUTPATIENT
Start: 2023-10-02

## 2023-10-02 NOTE — TELEPHONE ENCOUNTER
Helene POOLE  [facility]  Lisinopril  2.5 mg  1 tab daily   30 with 5 refills     Dr Herrera Deaconnie on [address] in [employer]

## 2023-11-02 DIAGNOSIS — I10 PRIMARY HYPERTENSION: ICD-10-CM

## 2023-11-02 RX ORDER — LISINOPRIL 2.5 MG/1
2.5 TABLET ORAL DAILY
Qty: 90 TABLET | Refills: 3 | Status: SHIPPED | OUTPATIENT
Start: 2023-11-02

## 2023-11-14 ENCOUNTER — APPOINTMENT (OUTPATIENT)
Dept: LAB | Facility: HOSPITAL | Age: 78
End: 2023-11-14
Payer: COMMERCIAL

## 2023-11-14 DIAGNOSIS — I10 HYPERTENSION, UNSPECIFIED TYPE: ICD-10-CM

## 2023-11-14 LAB
ALBUMIN SERPL BCP-MCNC: 4.3 G/DL (ref 3.5–5)
ALP SERPL-CCNC: 55 U/L (ref 34–104)
ALT SERPL W P-5'-P-CCNC: 14 U/L (ref 7–52)
ANION GAP SERPL CALCULATED.3IONS-SCNC: 9 MMOL/L
AST SERPL W P-5'-P-CCNC: 22 U/L (ref 13–39)
BILIRUB SERPL-MCNC: 0.95 MG/DL (ref 0.2–1)
BUN SERPL-MCNC: 21 MG/DL (ref 5–25)
CALCIUM SERPL-MCNC: 9.6 MG/DL (ref 8.4–10.2)
CHLORIDE SERPL-SCNC: 107 MMOL/L (ref 96–108)
CO2 SERPL-SCNC: 26 MMOL/L (ref 21–32)
CREAT SERPL-MCNC: 1.09 MG/DL (ref 0.6–1.3)
GFR SERPL CREATININE-BSD FRML MDRD: 48 ML/MIN/1.73SQ M
GLUCOSE P FAST SERPL-MCNC: 100 MG/DL (ref 65–99)
POTASSIUM SERPL-SCNC: 5 MMOL/L (ref 3.5–5.3)
PROT SERPL-MCNC: 7.3 G/DL (ref 6.4–8.4)
SODIUM SERPL-SCNC: 142 MMOL/L (ref 135–147)

## 2023-11-14 PROCEDURE — 80053 COMPREHEN METABOLIC PANEL: CPT

## 2023-11-14 PROCEDURE — 36415 COLL VENOUS BLD VENIPUNCTURE: CPT

## 2024-01-12 DIAGNOSIS — E78.5 HYPERLIPIDEMIA, UNSPECIFIED HYPERLIPIDEMIA TYPE: ICD-10-CM

## 2024-01-12 RX ORDER — ATORVASTATIN CALCIUM 20 MG/1
20 TABLET, FILM COATED ORAL DAILY
Qty: 30 TABLET | Refills: 6 | Status: SHIPPED | OUTPATIENT
Start: 2024-01-12

## 2024-01-22 ENCOUNTER — APPOINTMENT (OUTPATIENT)
Dept: LAB | Facility: HOSPITAL | Age: 79
End: 2024-01-22
Payer: COMMERCIAL

## 2024-01-22 ENCOUNTER — HOSPITAL ENCOUNTER (OUTPATIENT)
Dept: RADIOLOGY | Facility: HOSPITAL | Age: 79
Discharge: HOME | End: 2024-01-22
Payer: COMMERCIAL

## 2024-01-22 DIAGNOSIS — E78.2 MIXED HYPERLIPIDEMIA: ICD-10-CM

## 2024-01-22 DIAGNOSIS — I10 PRIMARY HYPERTENSION: ICD-10-CM

## 2024-01-22 DIAGNOSIS — I10 HYPERTENSION, UNSPECIFIED TYPE: ICD-10-CM

## 2024-01-22 DIAGNOSIS — E78.5 HYPERLIPIDEMIA, UNSPECIFIED HYPERLIPIDEMIA TYPE: ICD-10-CM

## 2024-01-22 DIAGNOSIS — R93.89 ABNORMAL CHEST X-RAY: ICD-10-CM

## 2024-01-22 LAB
25(OH)D3 SERPL-MCNC: 24.8 NG/ML (ref 30–100)
ALBUMIN SERPL BCP-MCNC: 4.4 G/DL (ref 3.5–5)
ALP SERPL-CCNC: 51 U/L (ref 34–104)
ALT SERPL W P-5'-P-CCNC: 17 U/L (ref 7–52)
ANION GAP SERPL CALCULATED.3IONS-SCNC: 8 MMOL/L
AST SERPL W P-5'-P-CCNC: 22 U/L (ref 13–39)
BILIRUB SERPL-MCNC: 1.05 MG/DL (ref 0.2–1)
BUN SERPL-MCNC: 22 MG/DL (ref 5–25)
CALCIUM SERPL-MCNC: 9.6 MG/DL (ref 8.4–10.2)
CHLORIDE SERPL-SCNC: 104 MMOL/L (ref 96–108)
CHOLEST SERPL-MCNC: 154 MG/DL
CO2 SERPL-SCNC: 27 MMOL/L (ref 21–32)
CREAT SERPL-MCNC: 1.16 MG/DL (ref 0.6–1.3)
ERYTHROCYTE [DISTWIDTH] IN BLOOD BY AUTOMATED COUNT: 13.5 % (ref 11.6–15.1)
GFR SERPL CREATININE-BSD FRML MDRD: 44 ML/MIN/1.73SQ M
GLUCOSE P FAST SERPL-MCNC: 93 MG/DL (ref 65–99)
HCT VFR BLD AUTO: 42.1 % (ref 34.8–46.1)
HDLC SERPL-MCNC: 61 MG/DL
HGB BLD-MCNC: 13.7 G/DL (ref 11.5–15.4)
LDLC SERPL CALC-MCNC: 68 MG/DL (ref 0–100)
MCH RBC QN AUTO: 31.1 PG (ref 26.8–34.3)
MCHC RBC AUTO-ENTMCNC: 32.5 G/DL (ref 31.4–37.4)
MCV RBC AUTO: 96 FL (ref 82–98)
PLATELET # BLD AUTO: 261 THOUSANDS/UL (ref 149–390)
PMV BLD AUTO: 10.8 FL (ref 8.9–12.7)
POTASSIUM SERPL-SCNC: 4.7 MMOL/L (ref 3.5–5.3)
PROT SERPL-MCNC: 7.5 G/DL (ref 6.4–8.4)
RBC # BLD AUTO: 4.4 MILLION/UL (ref 3.81–5.12)
SODIUM SERPL-SCNC: 139 MMOL/L (ref 135–147)
TRIGL SERPL-MCNC: 126 MG/DL
TSH SERPL DL<=0.05 MIU/L-ACNC: 2.54 UIU/ML (ref 0.45–4.5)
VIT B12 SERPL-MCNC: 335 PG/ML (ref 180–914)
WBC # BLD AUTO: 8.66 THOUSAND/UL (ref 4.31–10.16)

## 2024-01-22 PROCEDURE — 36415 COLL VENOUS BLD VENIPUNCTURE: CPT

## 2024-01-22 PROCEDURE — 80053 COMPREHEN METABOLIC PANEL: CPT

## 2024-01-22 PROCEDURE — 85027 COMPLETE CBC AUTOMATED: CPT

## 2024-01-22 PROCEDURE — 82607 VITAMIN B-12: CPT

## 2024-01-22 PROCEDURE — 80061 LIPID PANEL: CPT

## 2024-01-22 PROCEDURE — 82306 VITAMIN D 25 HYDROXY: CPT

## 2024-01-22 PROCEDURE — 84443 ASSAY THYROID STIM HORMONE: CPT

## 2024-01-22 PROCEDURE — 71046 X-RAY EXAM CHEST 2 VIEWS: CPT

## 2024-01-23 ENCOUNTER — OFFICE VISIT (OUTPATIENT)
Dept: CARDIOLOGY CLINIC | Facility: CLINIC | Age: 79
End: 2024-01-23
Payer: COMMERCIAL

## 2024-01-23 VITALS
HEART RATE: 97 BPM | BODY MASS INDEX: 25.3 KG/M2 | DIASTOLIC BLOOD PRESSURE: 80 MMHG | WEIGHT: 148.2 LBS | HEIGHT: 64 IN | SYSTOLIC BLOOD PRESSURE: 128 MMHG

## 2024-01-23 DIAGNOSIS — R42 DIZZINESS: ICD-10-CM

## 2024-01-23 DIAGNOSIS — I65.23 ASYMPTOMATIC BILATERAL CAROTID ARTERY STENOSIS: ICD-10-CM

## 2024-01-23 DIAGNOSIS — E78.5 HYPERLIPIDEMIA, UNSPECIFIED HYPERLIPIDEMIA TYPE: ICD-10-CM

## 2024-01-23 DIAGNOSIS — I10 HYPERTENSION, UNSPECIFIED TYPE: Primary | ICD-10-CM

## 2024-01-23 DIAGNOSIS — R06.83 SNORING: ICD-10-CM

## 2024-01-23 PROCEDURE — 99214 OFFICE O/P EST MOD 30 MIN: CPT | Performed by: INTERNAL MEDICINE

## 2024-01-23 PROCEDURE — 93000 ELECTROCARDIOGRAM COMPLETE: CPT | Performed by: INTERNAL MEDICINE

## 2024-01-23 NOTE — PROGRESS NOTES
Cardiology Follow up    Bibi Hanna  [id number]  1945  [facility] [address] [id number]      1. Hypertension, unspecified type  POCT ECG      2. Asymptomatic bilateral carotid artery stenosis  POCT ECG      3. Hyperlipidemia, unspecified hyperlipidemia type        4. Dizziness        5. Snoring            Discussion/Summary:  1.  Hypertension  2.  Orthostatic Lightheadedness/dizziness  3.  Carotid stenosis  4.  Hyperlipidemia  5.  Snoring    -ECG performed in the office today shows sinus rhythm heart rate 97 bpm with possible left atrial enlargement  - orthostatic vitals laying 132/86mmHG with heartrate 92bpm, seated 138/86 mmHg  heart rate 94 bpm, standing 142/90mmHg heart rate 74 bpm with patient having symptoms of lightheadeness with standing  -Carotid Doppler 6/26/2023 showing less than 50% stenosis of bilateral internal carotid arteries and will continue to follow with vascular team  -Transthoracic echocardiogram 6/26/2023 showing left-ventricular systolic function normal estimated LVEF 55% with grade 1 diastolic dysfunction, mild tricuspid regurgitation and normal right ventricular systolic pressure with aortic root measuring 3.7 cm in largest diameter  -Holter monitor 6/5/2023 showing predominantly sinus rhythm average heart rate 71 bpm with rare ectopic activity but no significant arrhythmias appreciated  -Patient's symptoms of lightheadedness resolved while in office however I did offer to have patient seen and evaluated emergency department however at this time she wishes to hold off but do agree with her primary care physician that she should be seen and evaluated by neurology team as well is even though she has orthostatic lightheadedness blood pressure increased with standing despite worsening of patient's symptoms although not to a  very large degree.  -Patient will continue atorvastatin 20 mg daily, Bystolic 2.5 mg daily, lisinopril 2.5 mg daily  -Patient does note she has only had a tiny amount of water today and some coffee up to this point which may also be contributing to her symptoms.  -Patient counseled on dietary and lifestyle modifications including following a low-salt, low-fat, heart healthy diet  -Will have patient undergo 2-week Zio patch monitor to evaluate for any significant arrhythmias although remained in sinus rhythm during symptoms today  -Patient will be seen in 3 months or sooner if necessary  -Patient counseled on the importance of evaluation by sleep medicine team as well  -Patient does note a potential degree of anxiety to this due to concern over recent memory loss issues as well.          History of Present Illness:  -Patient is a 78-year-old female with hypertension, hyperlipidemia who was initially seen and evaluated at [facility] 3/30/2023 for dizziness and fall.  At that time patient had been leaning over for an extended period of time over her garden bed and became lightheaded and fell.  She did not have any loss of consciousness but did strike her head which caused a laceration to her left eyebrow and therefore presented to emergency department for further evaluation.  In emergency department patient blood pressure was significantly elevated 206/113 mmHg and underwent evaluation and eventually able to be discharged to outpatient follow-up where she was seen and evaluated initially in the office in May 2023.  -Currently in the office today patient denies any chest pain, palpitations, loss of consciousness, shortness of breath or lower extremity edema.  She does note episodes of lightheadedness particularly with quick change in position although notes it is more if she changes her head position quickly whether it is to turn her head sharply to look someplace or if she looks down rapidly and then  needs to lay down to assist with this.  She has blood pressures at home are well-controlled on current medical therapy and continues to be physically active and denies any exertional symptoms with this.      Patient Active Problem List   Diagnosis    Asymptomatic bilateral carotid artery stenosis    Hypertension    Hyperlipidemia     Past Medical History:   Diagnosis Date    Hypercholesteremia     Hypertension      Social History     Socioeconomic History    Marital status: /Civil Union     Spouse name: Not on file    Number of children: Not on file    Years of education: Not on file    Highest education level: Not on file   Occupational History    Not on file   Tobacco Use    Smoking status: Never    Smokeless tobacco: Never   Vaping Use    Vaping status: Never Used   Substance and Sexual Activity    Alcohol use: Yes    Drug use: Never    Sexual activity: Not on file   Other Topics Concern    Not on file   Social History Narrative    Daily caffeine use- 2 cup of coffee, hot tea-1 cup on occasion     Social Determinants of Health     Financial Resource Strain: Not on file   Food Insecurity: Not on file   Transportation Needs: Not on file   Physical Activity: Not on file   Stress: Not on file   Social Connections: Not on file   Intimate Partner Violence: Not on file   Housing Stability: Not on file      Family History   Problem Relation Age of Onset    Cancer Mother     Cervical cancer Mother     No Known Problems Sister     No Known Problems Maternal Grandmother     No Known Problems Paternal Grandmother     No Known Problems Sister     No Known Problems Maternal Aunt     No Known Problems Maternal Aunt     Breast cancer Paternal Aunt      Past Surgical History:   Procedure Laterality Date    HYSTERECTOMY      OOPHORECTOMY         Current Outpatient Medications:     aspirin 81 mg chewable tablet, Chew 1 tablet (81 mg total) daily, Disp: , Rfl:     atorvastatin (LIPITOR) 20 mg tablet, take 1 tablet by mouth  once daily, Disp: 30 tablet, Rfl: 6    lisinopril (ZESTRIL) 2.5 mg tablet, take 1 tablet by mouth once daily, Disp: 90 tablet, Rfl: 3    BYSTOLIC 5 MG tablet, Take 2.5 mg by mouth daily (Patient not taking: Reported on 7/13/2023), Disp: , Rfl:   No Known Allergies      Labs:  Appointment on 01/22/2024   Component Date Value    Vitamin B-12 01/22/2024 335     WBC 01/22/2024 8.66     RBC 01/22/2024 4.40     Hemoglobin 01/22/2024 13.7     Hematocrit 01/22/2024 42.1     MCV 01/22/2024 96     MCH 01/22/2024 31.1     MCHC 01/22/2024 32.5     RDW 01/22/2024 13.5     Platelets 01/22/2024 261     MPV 01/22/2024 10.8     Vit D, 25-Hydroxy 01/22/2024 24.8 (L)     Sodium 01/22/2024 139     Potassium 01/22/2024 4.7     Chloride 01/22/2024 104     CO2 01/22/2024 27     ANION GAP 01/22/2024 8     BUN 01/22/2024 22     Creatinine 01/22/2024 1.16     Glucose, Fasting 01/22/2024 93     Calcium 01/22/2024 9.6     AST 01/22/2024 22     ALT 01/22/2024 17     Alkaline Phosphatase 01/22/2024 51     Total Protein 01/22/2024 7.5     Albumin 01/22/2024 4.4     Total Bilirubin 01/22/2024 1.05 (H)     eGFR 01/22/2024 44     TSH 3RD GENERATON 01/22/2024 2.543     Cholesterol 01/22/2024 154     Triglycerides 01/22/2024 126     HDL, Direct 01/22/2024 61     LDL Calculated 01/22/2024 68         Imaging: XR chest pa & lateral    Result Date: 1/22/2024  Narrative: CHEST INDICATION:   Abnormal findings on diagnostic imaging of other specified body structures. COMPARISON:  Comparison made with previous examination(s) dated (DX) 30-Mar-2023,(CT) 30-Mar-2023. EXAM PERFORMED/VIEWS:  XR CHEST PA & LATERAL FINDINGS: Cardiomediastinal silhouette appears unremarkable. The lungs are clear.  No pneumothorax or pleural effusion. Osseous structures appear within normal limits for patient age.     Impression: No acute cardiopulmonary disease. Mildly hyperinflated. No significant change from prior study of 3/30/2023. Electronically signed: 01/22/2024 09:34 AM  "Dg Murphy MD      Review of Systems:  Review of Systems   Constitutional:  Negative for chills, diaphoresis, fatigue and fever.   HENT:  Negative for trouble swallowing and voice change.    Eyes:  Negative for pain and redness.   Respiratory:  Negative for shortness of breath and wheezing.    Gastrointestinal:  Negative for abdominal pain, constipation, diarrhea, nausea and vomiting.   Genitourinary:  Negative for dysuria.   Musculoskeletal:  Positive for arthralgias.   Skin:  Negative for rash.   Neurological:  Positive for dizziness and light-headedness. Negative for syncope and headaches.   Psychiatric/Behavioral:  Negative for agitation and confusion.          Vitals:    01/23/24 0929   BP: 128/80   BP Location: Left arm   Pulse: 97   Weight: 67.2 kg (148 lb 3.2 oz)   Height: 5' 4\" (1.626 m)     Vitals:    01/23/24 0929   Weight: 67.2 kg (148 lb 3.2 oz)     Height: 5' 4\" (162.6 cm)     Physical Exam:  Physical Exam  Vitals reviewed.   Constitutional:       General: She is not in acute distress.     Appearance: Normal appearance. She is not diaphoretic.   HENT:      Head: Normocephalic and atraumatic.   Eyes:      General:         Right eye: No discharge.         Left eye: No discharge.   Neck:      Comments: Trachea midline, no JVD  Cardiovascular:      Rate and Rhythm: Normal rate and regular rhythm.      Heart sounds:      No friction rub.   Pulmonary:      Effort: Pulmonary effort is normal. No respiratory distress.      Breath sounds: No wheezing.   Chest:      Chest wall: No tenderness.   Abdominal:      General: Bowel sounds are normal.      Palpations: Abdomen is soft.      Tenderness: There is no abdominal tenderness. There is no rebound.   Musculoskeletal:      Right lower leg: No edema.      Left lower leg: No edema.   Skin:     General: Skin is warm and dry.   Neurological:      Mental Status: She is alert.      Comments: Awake, alert, able to answer questions appropriately, able to move " extremities bilaterally.   Psychiatric:         Mood and Affect: Mood normal.         Behavior: Behavior normal.

## 2024-04-25 ENCOUNTER — OFFICE VISIT (OUTPATIENT)
Dept: CARDIOLOGY CLINIC | Facility: CLINIC | Age: 79
End: 2024-04-25
Payer: COMMERCIAL

## 2024-04-25 VITALS
SYSTOLIC BLOOD PRESSURE: 126 MMHG | BODY MASS INDEX: 25.13 KG/M2 | HEIGHT: 64 IN | WEIGHT: 147.2 LBS | DIASTOLIC BLOOD PRESSURE: 80 MMHG | HEART RATE: 80 BPM

## 2024-04-25 DIAGNOSIS — R42 ORTHOSTATIC DIZZINESS: ICD-10-CM

## 2024-04-25 DIAGNOSIS — I10 PRIMARY HYPERTENSION: Primary | ICD-10-CM

## 2024-04-25 DIAGNOSIS — R06.83 SNORING: ICD-10-CM

## 2024-04-25 DIAGNOSIS — I65.23 ASYMPTOMATIC BILATERAL CAROTID ARTERY STENOSIS: ICD-10-CM

## 2024-04-25 DIAGNOSIS — E66.3 OVERWEIGHT (BMI 25.0-29.9): ICD-10-CM

## 2024-04-25 DIAGNOSIS — E78.5 HYPERLIPIDEMIA, UNSPECIFIED HYPERLIPIDEMIA TYPE: ICD-10-CM

## 2024-04-25 PROCEDURE — 99214 OFFICE O/P EST MOD 30 MIN: CPT | Performed by: INTERNAL MEDICINE

## 2024-04-25 NOTE — PROGRESS NOTES
Cardiology Follow up    Bibi Hanna  [id number]  1945   [facility] [address] [address]      1. Primary hypertension        2. Asymptomatic bilateral carotid artery stenosis        3. Hyperlipidemia, unspecified hyperlipidemia type        4. Overweight (BMI 25.0-29.9)        5. Orthostatic dizziness        6. Snoring            Discussion/Summary:  1.  Hypertension  2.  Orthostatic dizziness-improved  3.  Carotid stenosis  4.  Hyperlipidemia  5.  Snoring  6.  Overweight    -Transthoracic echocardiogram 6/26/2023 showing left ventricular systolic function normal estimated LVEF 55% grade 1 diastolic dysfunction, mild tricuspid regurgitation with aortic root measuring 3.7 cm in largest diameter  -Holter monitor 6/5/2023 showing predominantly sinus rhythm average heart rate 71 bpm with rare ectopic activity but no significant atrial fibrillation, ventricular tachycardia, advanced AV block or significant pauses were appreciated.  -As patient blood pressure well-controlled this time can continue Bystolic 2.5 mg daily and lisinopril 2.5 mg daily along with atorvastatin 20 mg daily  -Patient will continue to follow with vascular team for carotid Doppler monitoring and was counseled on the importance of neurology evaluation along with evaluation for potential sleep apnea by sleep medicine.  -Fasting lipid panel 1/22/2024 showing total cholesterol 154, triglyceride 126, HDL 61, LDL 68  -Counseled patient on dietary and lifestyle modifications including following a low-salt, low-fat, heart healthy diet with continued physical activity and monitoring for symptoms.  -Patient will be seen in 6 months or sooner if necessary  -Patient counseled if she were to have any warning or alarm type symptoms she is to seek emergency medical care immediately.        History  of Present Illness:  -Patient is a 78-year-old female with hypertension, hyperlipidemia who was initially seen and evaluated at [facility] in March 2023 for dizziness and fall.  At that time patient had been leaning over for an extended period of time over her garden bed and became lightheaded and fell.  She did not have any loss of consciousness but did strike her head which caused a laceration to her left eyebrow and therefore presented to the emergency department for further evaluation.  In emergency department patient's blood pressure was significantly elevated 206/113 mmHg and underwent evaluation but was eventually able to be discharged to outpatient follow-up and was eventually seen in May 2023.  Patient has been having some issues with continued lightheadedness and was recommended to see neurology team as well his blood pressures were better controlled however has not yet been able to make appointment but is going to be looking into this further and presents to the office today for scheduled follow-up.  -Currently in the office today she denies any chest pain, palpitations, lightness or dizziness, loss of consciousness, shortness of breath, lower extremity edema, orthopnea or bendopnea.  She notes blood pressures at home have been very well-controlled on current medical regimen including low-dose lisinopril and Bystolic.  She notes that she has been able to be out exercising and walking with no significant exertional symptoms and overall is doing well at this time.        Patient Active Problem List   Diagnosis    Asymptomatic bilateral carotid artery stenosis    Hypertension    Hyperlipidemia     Past Medical History:   Diagnosis Date    Hypercholesteremia     Hypertension      Social History     Socioeconomic History    Marital status: /Civil Union     Spouse name: Not on file    Number of children: Not on file    Years of education: Not on file    Highest education level: Not on file    Occupational History    Not on file   Tobacco Use    Smoking status: Never    Smokeless tobacco: Never   Vaping Use    Vaping status: Never Used   Substance and Sexual Activity    Alcohol use: Yes    Drug use: Never    Sexual activity: Not on file   Other Topics Concern    Not on file   Social History Narrative    Daily caffeine use- 2 cup of coffee, hot tea-1 cup on occasion     Social Determinants of Health     Financial Resource Strain: Not on file   Food Insecurity: Not on file   Transportation Needs: Not on file   Physical Activity: Not on file   Stress: Not on file   Social Connections: Not on file   Intimate Partner Violence: Not on file   Housing Stability: Not on file      Family History   Problem Relation Age of Onset    Cancer Mother     Cervical cancer Mother     No Known Problems Sister     No Known Problems Maternal Grandmother     No Known Problems Paternal Grandmother     No Known Problems Sister     No Known Problems Maternal Aunt     No Known Problems Maternal Aunt     Breast cancer Paternal Aunt      Past Surgical History:   Procedure Laterality Date    HYSTERECTOMY      OOPHORECTOMY         Current Outpatient Medications:     aspirin 81 mg chewable tablet, Chew 1 tablet (81 mg total) daily, Disp: , Rfl:     atorvastatin (LIPITOR) 20 mg tablet, take 1 tablet by mouth once daily, Disp: 30 tablet, Rfl: 6    BYSTOLIC 5 MG tablet, Take 2.5 mg by mouth daily, Disp: , Rfl:     lisinopril (ZESTRIL) 2.5 mg tablet, take 1 tablet by mouth once daily, Disp: 90 tablet, Rfl: 3  No Known Allergies      Labs:  No visits with results within 2 Month(s) from this visit.   Latest known visit with results is:   Appointment on 01/22/2024   Component Date Value    Vitamin B-12 01/22/2024 335     WBC 01/22/2024 8.66     RBC 01/22/2024 4.40     Hemoglobin 01/22/2024 13.7     Hematocrit 01/22/2024 42.1     MCV 01/22/2024 96     MCH 01/22/2024 31.1     MCHC 01/22/2024 32.5     RDW 01/22/2024 13.5     Platelets 01/22/2024  "261     MPV 01/22/2024 10.8     Vit D, 25-Hydroxy 01/22/2024 24.8 (L)     Sodium 01/22/2024 139     Potassium 01/22/2024 4.7     Chloride 01/22/2024 104     CO2 01/22/2024 27     ANION GAP 01/22/2024 8     BUN 01/22/2024 22     Creatinine 01/22/2024 1.16     Glucose, Fasting 01/22/2024 93     Calcium 01/22/2024 9.6     AST 01/22/2024 22     ALT 01/22/2024 17     Alkaline Phosphatase 01/22/2024 51     Total Protein 01/22/2024 7.5     Albumin 01/22/2024 4.4     Total Bilirubin 01/22/2024 1.05 (H)     eGFR 01/22/2024 44     TSH 3RD GENERATON 01/22/2024 2.543     Cholesterol 01/22/2024 154     Triglycerides 01/22/2024 126     HDL, Direct 01/22/2024 61     LDL Calculated 01/22/2024 68         Imaging: No results found.    Review of Systems:  Review of Systems   Constitutional:  Negative for chills, diaphoresis, fatigue and fever.   HENT:  Negative for trouble swallowing and voice change.    Eyes:  Negative for pain and redness.   Respiratory:  Negative for shortness of breath and wheezing.    Cardiovascular:  Negative for chest pain, palpitations and leg swelling.   Gastrointestinal:  Negative for abdominal pain, constipation, diarrhea, nausea and vomiting.   Genitourinary:  Negative for dysuria.   Musculoskeletal:  Positive for arthralgias. Negative for neck pain and neck stiffness.   Skin:  Negative for rash.   Neurological:  Negative for dizziness, syncope, light-headedness and headaches.   Psychiatric/Behavioral:  Negative for agitation and confusion.    All other systems reviewed and are negative.        Vitals:    04/25/24 0851   BP: 126/80   Pulse: 80   Weight: 66.8 kg (147 lb 3.2 oz)   Height: 5' 4\" (1.626 m)     Vitals:    04/25/24 0851   Weight: 66.8 kg (147 lb 3.2 oz)     Height: 5' 4\" (162.6 cm)     Physical Exam:   Physical Exam  Vitals reviewed.   Constitutional:       General: She is not in acute distress.     Appearance: Normal appearance. She is not diaphoretic.   HENT:      Head: Normocephalic and " atraumatic.   Eyes:      General:         Right eye: No discharge.         Left eye: Discharge present.  Neck:      Comments: Trachea midline, no JVD present  Cardiovascular:      Rate and Rhythm: Normal rate and regular rhythm.      Heart sounds:      No friction rub.   Pulmonary:      Effort: Pulmonary effort is normal. No respiratory distress.      Breath sounds: No wheezing.   Chest:      Chest wall: No tenderness.   Abdominal:      General: Bowel sounds are normal.      Palpations: Abdomen is soft.      Tenderness: There is no abdominal tenderness. There is no rebound.   Musculoskeletal:      Right lower leg: No edema.      Left lower leg: No edema.   Skin:     General: Skin is warm and dry.   Neurological:      Mental Status: She is alert.      Comments: Awake, alert, able to answer questions appropriately, able to move extremities bilaterally.   Psychiatric:         Mood and Affect: Mood normal.         Behavior: Behavior normal.

## 2024-06-11 ENCOUNTER — EVALUATION (OUTPATIENT)
Dept: PHYSICAL THERAPY | Facility: CLINIC | Age: 79
End: 2024-06-11
Payer: COMMERCIAL

## 2024-06-11 DIAGNOSIS — H81.13 BPPV (BENIGN PAROXYSMAL POSITIONAL VERTIGO), BILATERAL: ICD-10-CM

## 2024-06-11 PROCEDURE — 97112 NEUROMUSCULAR REEDUCATION: CPT | Performed by: PHYSICAL THERAPIST

## 2024-06-11 PROCEDURE — 97162 PT EVAL MOD COMPLEX 30 MIN: CPT | Performed by: PHYSICAL THERAPIST

## 2024-06-11 NOTE — PROGRESS NOTES
PT Evaluation     Today's date: 2024  Patient name: Bibi Hanna  : 1945  MRN: [id number]  Referring provider: Ronen Jackson DO  Dx:   Encounter Diagnosis     ICD-10-CM    1. BPPV (benign paroxysmal positional vertigo), bilateral  H81.13 Ambulatory referral to Physical Therapy                     Assessment    Assessment details: Pt is a 78 y.o. female referred to OPPT for vertigo. Presents with positive B [place] Hallpike suggestive of B posterior canalithiasis with upbeating, torsional nystagmus observed in R (L was not observed since pt shut her eyes, but she was symptomatic). She was more symptomatic on the R so focused on R today. Performed R CRM x 3, however pt had difficulty keeping head and neck in the proper position since pt wanted to lift up when she was dizzy. [place] then appeared to have converted from posterior canal to horizontal since horizontal nystagmus was observed during 3rd attempt of CRM so performed R BBQ x 1. Educated pt on BPPV and reviewed post maneuver precautions in which pt reported understanding. Pt would benefit from skilled PT as needed to resolve BPPV and return to PLOF. Additional vestibular and balance testing will be performed in future sessions as needed if pt has any residual deficits.       Goals  ST weeks  Demo negative positional testing   Report understanding of BPPV    LT weeks  Report no dizziness or vertigo  Report balance is at PLOF       Plan    Planned therapy interventions: canalith repositioning, balance, home exercise program and patient/caregiver education    Frequency: 2x week  Duration in weeks: 4  Plan of Care beginning date: 2024  Plan of Care expiration date: 2024  Treatment plan discussed with: patient and family        Subjective Evaluation    History of Present Illness  Mechanism of injury: Pt referred to OPPT for B BPPV by ENT. Pt's  Ramos was present during IE and assisted with history since pt has poor  memory. Has had lightheadedness which has been going on for over a year. Was previously going to supervised exercise classes in [place] but per pt, was told to not come back due to a dizziness episode. When she gets up too fast sometimes or looks down, she gets lightheaded. Gets lightheaded everyday. Has hard time gardening because of it which she loves to do. Fell about 1 year ago in which she hit her head from bending over pulling out weeds. Has not had any fall since. Has to see a neurologist for STM loss. Pt's  feels like she has some balance issues. Sits for about 15 min before standing up in bed but reports that she is not dizzy. Gets dizzy when standing up after watching t.v. Also does not lay flat in bed. Denies spinning when rolling in bed or sitting up/laying down. Denies spinning dizziness initially but later reported that she has had episodes of vertigo over the last year and has been tested for it in the past.     BP meds were cut back by PCP but then saw cardiologist who increased it per Ramos. Thought to have orthostatic hypotension contributing to dizziness by cardiologist.        Patient Goals  Patient goal: not sure  Pain  No pain reported          Objective     Concurrent Complaints  Positive for memory loss (years) and poor concentration. Negative for headaches, nausea/motion sickness, tinnitus, visual change, hearing loss, aural fullness and peripheral neuropathy  Neuro Exam:     Exacerbating factors  Positive for bending over, looking up and supine to/from sitting (getting up).   Negative for rolling in bed, walking, turning head, optokinetic movement and walking in busy environment.     Symptoms   Duration: 5 min    Headaches   Patient reports headaches: No.     Cervical exam   Modified VBI   Left: asymptomatic  Right: asymptomatic    Positional testing   [place]-Hallpike   Left posterior canal: symptomatic  Right posterior canal: symptomatic, torsional and upbeating              Insurance: [facility] Rep  Auth # of visits: not required  Expiration date: NA    FOTO: completed IE 6/11    Re-eval Date: 7/11    Date 6/11       Visit # 1       Pain In        Pain Out        Vitals             Precautions HTN       Manuals                                        Neuro Re-Ed         Education on BPPV diagnosis, prognosis, and post maneuver precautions, 10 min  10 min         R CRM x 3, R BBQ x 1                                               Ther Ex                                                                        Ther Activity                        Gait Training                        Modalities

## 2024-06-13 ENCOUNTER — OFFICE VISIT (OUTPATIENT)
Dept: PHYSICAL THERAPY | Facility: CLINIC | Age: 79
End: 2024-06-13
Payer: COMMERCIAL

## 2024-06-13 DIAGNOSIS — H81.13 BPPV (BENIGN PAROXYSMAL POSITIONAL VERTIGO), BILATERAL: Primary | ICD-10-CM

## 2024-06-13 PROCEDURE — 97112 NEUROMUSCULAR REEDUCATION: CPT | Performed by: PHYSICAL THERAPIST

## 2024-06-13 NOTE — PROGRESS NOTES
Daily Note     Today's date: 2024  Patient name: Bibi Hanna  : 1945  MRN: [id number]  Referring provider: Ronen Jackson DO  Dx:   Encounter Diagnosis     ICD-10-CM    1. BPPV (benign paroxysmal positional vertigo), bilateral  H81.13                      Subjective: Feeling better.       Objective: See treatment diary below      Assessment: Continued to demo positive B Carter Hallpike. Treated both sides today however upbeating, torsional nystagmus was observed in traditional [place] Hallpike position only and not in sidelying. She continued to be positive at end of session despite multiple maneuvers. She was lightheaded at end of session and needed to sit for a couple minutes before walking out with her  using HHA. Patient would benefit from continued PT      Plan: Progress treatment as tolerated.       Insurance: [employer] Rep  Auth # of visits: not required  Expiration date: NA    FOTO: completed IE     Re-eval Date:     Date       Visit # 1 2      Pain In        Pain Out        Vitals             Precautions HTN       Manuals                                        Neuro Re-Ed         Education on BPPV diagnosis, prognosis, and post maneuver precautions, 10 min  10 min         R CRM x 3, R BBQ x 1 R CRM x 2, R Semont x 2, L CRM x 1, L Semont x 1                                               Ther Ex                                                                        Ther Activity                        Gait Training                        Modalities

## 2024-06-18 ENCOUNTER — OFFICE VISIT (OUTPATIENT)
Dept: PHYSICAL THERAPY | Facility: CLINIC | Age: 79
End: 2024-06-18
Payer: COMMERCIAL

## 2024-06-18 DIAGNOSIS — H81.13 BPPV (BENIGN PAROXYSMAL POSITIONAL VERTIGO), BILATERAL: Primary | ICD-10-CM

## 2024-06-18 PROCEDURE — 97112 NEUROMUSCULAR REEDUCATION: CPT | Performed by: PHYSICAL THERAPIST

## 2024-06-18 NOTE — PROGRESS NOTES
Daily Note     Today's date: 2024  Patient name: Bibi Hanna  : 1945  MRN: [id number]  Referring provider: Ronen Jackson DO  Dx:   Encounter Diagnosis     ICD-10-CM    1. BPPV (benign paroxysmal positional vertigo), bilateral  H81.13                      Subjective: Feeling better.       Objective: See treatment diary below      Assessment: Continued to demo positive R Carter Hallpike however L was negative today and appeared to have resolved. After R CRM x 2, otoconia appeared to have shifted from posterior to horizontal canal so performed R BBQ and Gufoni. Patient would benefit from continued PT      Plan: Progress treatment as tolerated.       Insurance: [employer] Rep  Auth # of visits: not required  Expiration date: NA    FOTO: completed IE     Re-eval Date:     Date      Visit # 1 2 3     Pain In        Pain Out        Vitals             Precautions HTN       Manuals                                        Neuro Re-Ed         Education on BPPV diagnosis, prognosis, and post maneuver precautions, 10 min  10 min         R CRM x 3, R BBQ x 1 R CRM x 2, R Semont x 2, L CRM x 1, L Semont x 1  R CRM with vibration, R CRM, R BBQ x 1, R gufoni x 1                                              Ther Ex                                                                        Ther Activity                        Gait Training                        Modalities

## 2024-06-19 ENCOUNTER — APPOINTMENT (OUTPATIENT)
Dept: PHYSICAL THERAPY | Facility: CLINIC | Age: 79
End: 2024-06-19
Payer: COMMERCIAL

## 2024-06-20 ENCOUNTER — OFFICE VISIT (OUTPATIENT)
Dept: PHYSICAL THERAPY | Facility: CLINIC | Age: 79
End: 2024-06-20
Payer: COMMERCIAL

## 2024-06-20 DIAGNOSIS — H81.13 BPPV (BENIGN PAROXYSMAL POSITIONAL VERTIGO), BILATERAL: Primary | ICD-10-CM

## 2024-06-20 PROCEDURE — 97112 NEUROMUSCULAR REEDUCATION: CPT | Performed by: PHYSICAL THERAPIST

## 2024-06-20 NOTE — PROGRESS NOTES
Daily Note     Today's date: 2024  Patient name: Bibi Hanna  : 1945  MRN: [id number]  Referring provider: Ronen Jackson DO  Dx:   Encounter Diagnosis     ICD-10-CM    1. BPPV (benign paroxysmal positional vertigo), bilateral  H81.13                      Subjective: Feeling good. Balance is PLOF and no spinning.       Objective: See treatment diary below      Assessment: Reassessed positional testing which was negative in all positions. She did have some residual dizziness upon sitting up so reviewed Eduar with pt and her . At this time, BPPV appeared to have resolved and she will be placed on 30 day hold. Recommended to return to PT if BPPV returns, residual dizziness does not resolve, or if she has any balance issues.       Plan: 30 day hold     Insurance: [employer] Rep  Auth # of visits: not required  Expiration date: NA    FOTO: completed IE     Re-eval Date:     Date     Visit # 1 2 3 4    Pain In        Pain Out        Vitals             Precautions HTN       Manuals                                        Neuro Re-Ed         Education on BPPV diagnosis, prognosis, and post maneuver precautions, 10 min  10 min         R CRM x 3, R BBQ x 1 R CRM x 2, R Semont x 2, L CRM x 1, L Semont x 1  R CRM with vibration, R CRM, R BBQ x 1, R gufoni x 1  Reassessed positional testing    Eduar 1x                                            Ther Ex                                                                        Ther Activity                        Gait Training                        Modalities

## 2024-08-17 DIAGNOSIS — E78.5 HYPERLIPIDEMIA, UNSPECIFIED HYPERLIPIDEMIA TYPE: ICD-10-CM

## 2024-08-18 RX ORDER — ATORVASTATIN CALCIUM 20 MG/1
20 TABLET, FILM COATED ORAL DAILY
Qty: 30 TABLET | Refills: 5 | Status: SHIPPED | OUTPATIENT
Start: 2024-08-18

## 2024-09-11 ENCOUNTER — APPOINTMENT (OUTPATIENT)
Dept: LAB | Facility: HOSPITAL | Age: 79
End: 2024-09-11
Payer: COMMERCIAL

## 2024-09-11 DIAGNOSIS — E78.2 MIXED HYPERLIPIDEMIA: ICD-10-CM

## 2024-09-11 DIAGNOSIS — I10 HYPERTENSION, UNSPECIFIED TYPE: ICD-10-CM

## 2024-09-11 DIAGNOSIS — E55.9 VITAMIN D DEFICIENCY: ICD-10-CM

## 2024-09-11 LAB
25(OH)D3 SERPL-MCNC: 34.7 NG/ML (ref 30–100)
ALBUMIN SERPL BCG-MCNC: 4.5 G/DL (ref 3.5–5)
ALP SERPL-CCNC: 41 U/L (ref 34–104)
ALT SERPL W P-5'-P-CCNC: 16 U/L (ref 7–52)
ANION GAP SERPL CALCULATED.3IONS-SCNC: 7 MMOL/L (ref 4–13)
AST SERPL W P-5'-P-CCNC: 20 U/L (ref 13–39)
BILIRUB SERPL-MCNC: 1.15 MG/DL (ref 0.2–1)
BUN SERPL-MCNC: 24 MG/DL (ref 5–25)
CALCIUM SERPL-MCNC: 9.9 MG/DL (ref 8.4–10.2)
CHLORIDE SERPL-SCNC: 104 MMOL/L (ref 96–108)
CHOLEST SERPL-MCNC: 160 MG/DL
CO2 SERPL-SCNC: 28 MMOL/L (ref 21–32)
CREAT SERPL-MCNC: 1.21 MG/DL (ref 0.6–1.3)
ERYTHROCYTE [DISTWIDTH] IN BLOOD BY AUTOMATED COUNT: 13.2 % (ref 11.6–15.1)
GFR SERPL CREATININE-BSD FRML MDRD: 42 ML/MIN/1.73SQ M
GLUCOSE P FAST SERPL-MCNC: 99 MG/DL (ref 65–99)
HCT VFR BLD AUTO: 42 % (ref 34.8–46.1)
HDLC SERPL-MCNC: 57 MG/DL
HGB BLD-MCNC: 13.6 G/DL (ref 11.5–15.4)
LDLC SERPL CALC-MCNC: 75 MG/DL (ref 0–100)
MCH RBC QN AUTO: 30.9 PG (ref 26.8–34.3)
MCHC RBC AUTO-ENTMCNC: 32.4 G/DL (ref 31.4–37.4)
MCV RBC AUTO: 96 FL (ref 82–98)
NONHDLC SERPL-MCNC: 103 MG/DL
PLATELET # BLD AUTO: 252 THOUSANDS/UL (ref 149–390)
PMV BLD AUTO: 10.4 FL (ref 8.9–12.7)
POTASSIUM SERPL-SCNC: 4.3 MMOL/L (ref 3.5–5.3)
PROT SERPL-MCNC: 7.5 G/DL (ref 6.4–8.4)
RBC # BLD AUTO: 4.4 MILLION/UL (ref 3.81–5.12)
SODIUM SERPL-SCNC: 139 MMOL/L (ref 135–147)
TRIGL SERPL-MCNC: 141 MG/DL
TSH SERPL DL<=0.05 MIU/L-ACNC: 2.62 UIU/ML (ref 0.45–4.5)
WBC # BLD AUTO: 7.79 THOUSAND/UL (ref 4.31–10.16)

## 2024-09-11 PROCEDURE — 80053 COMPREHEN METABOLIC PANEL: CPT

## 2024-09-11 PROCEDURE — 80061 LIPID PANEL: CPT

## 2024-09-11 PROCEDURE — 84443 ASSAY THYROID STIM HORMONE: CPT

## 2024-09-11 PROCEDURE — 82306 VITAMIN D 25 HYDROXY: CPT

## 2024-09-11 PROCEDURE — 36415 COLL VENOUS BLD VENIPUNCTURE: CPT

## 2024-09-11 PROCEDURE — 85027 COMPLETE CBC AUTOMATED: CPT

## 2024-09-15 DIAGNOSIS — E78.5 HYPERLIPIDEMIA, UNSPECIFIED HYPERLIPIDEMIA TYPE: ICD-10-CM

## 2024-09-17 RX ORDER — ATORVASTATIN CALCIUM 20 MG/1
20 TABLET, FILM COATED ORAL DAILY
Qty: 90 TABLET | Refills: 1 | Status: SHIPPED | OUTPATIENT
Start: 2024-09-17

## 2024-09-25 ENCOUNTER — HOSPITAL ENCOUNTER (OUTPATIENT)
Dept: MAMMOGRAPHY | Facility: HOSPITAL | Age: 79
Discharge: HOME | End: 2024-09-25
Payer: COMMERCIAL

## 2024-09-25 ENCOUNTER — HOSPITAL ENCOUNTER (OUTPATIENT)
Dept: BONE DENSITY | Facility: HOSPITAL | Age: 79
Discharge: HOME | End: 2024-09-25
Payer: COMMERCIAL

## 2024-09-25 VITALS — BODY MASS INDEX: 25.69 KG/M2 | WEIGHT: 145 LBS | HEIGHT: 63 IN

## 2024-09-25 DIAGNOSIS — E28.39 OVARIAN DEFICIENCY: ICD-10-CM

## 2024-09-25 DIAGNOSIS — Z12.31 BREAST CANCER SCREENING BY MAMMOGRAM: ICD-10-CM

## 2024-09-25 DIAGNOSIS — Z78.0 MENOPAUSE: ICD-10-CM

## 2024-09-25 PROCEDURE — 77080 DXA BONE DENSITY AXIAL: CPT

## 2024-09-25 PROCEDURE — 77067 SCR MAMMO BI INCL CAD: CPT

## 2024-09-25 PROCEDURE — 77063 BREAST TOMOSYNTHESIS BI: CPT

## 2024-11-12 ENCOUNTER — OFFICE VISIT (OUTPATIENT)
Dept: CARDIOLOGY CLINIC | Facility: CLINIC | Age: 79
End: 2024-11-12
Payer: COMMERCIAL

## 2024-11-12 VITALS
WEIGHT: 149 LBS | HEIGHT: 63 IN | SYSTOLIC BLOOD PRESSURE: 146 MMHG | HEART RATE: 88 BPM | BODY MASS INDEX: 26.4 KG/M2 | DIASTOLIC BLOOD PRESSURE: 96 MMHG

## 2024-11-12 DIAGNOSIS — E78.49 OTHER HYPERLIPIDEMIA: ICD-10-CM

## 2024-11-12 DIAGNOSIS — N18.9 CHRONIC KIDNEY DISEASE, UNSPECIFIED CKD STAGE: ICD-10-CM

## 2024-11-12 DIAGNOSIS — R06.83 SNORING: ICD-10-CM

## 2024-11-12 DIAGNOSIS — I10 PRIMARY HYPERTENSION: Primary | ICD-10-CM

## 2024-11-12 DIAGNOSIS — I65.23 ASYMPTOMATIC BILATERAL CAROTID ARTERY STENOSIS: ICD-10-CM

## 2024-11-12 DIAGNOSIS — E66.3 OVERWEIGHT (BMI 25.0-29.9): ICD-10-CM

## 2024-11-12 PROCEDURE — 99214 OFFICE O/P EST MOD 30 MIN: CPT | Performed by: INTERNAL MEDICINE

## 2024-11-12 RX ORDER — LISINOPRIL 10 MG/1
10 TABLET ORAL DAILY
Qty: 90 TABLET | Refills: 2 | Status: SHIPPED | OUTPATIENT
Start: 2024-11-12

## 2024-11-12 NOTE — ASSESSMENT & PLAN NOTE
-Follow-up pending carotid ultrasound  -Continue atorvastatin 20 mg daily  -Patient will follow-up with vascular team for monitoring.

## 2024-11-12 NOTE — ASSESSMENT & PLAN NOTE
-Counseled patient on dietary and lifestyle modifications  -Continue atorvastatin 20 mg daily  -Will repeat CMP and fasting lipid panel in 6 months prior to next office visit

## 2024-11-12 NOTE — PROGRESS NOTES
Patient ID: Bibi Hanna is a 79 y.o. female.        Plan:      Assessment & Plan  Asymptomatic bilateral carotid artery stenosis  -Follow-up pending carotid ultrasound  -Continue atorvastatin 20 mg daily  -Patient will follow-up with vascular team for monitoring.  Primary hypertension  -Mildly elevated in office today and patient notes at home  -Will increase lisinopril from 2.5 mg daily to 10 mg daily and repeat BMP in 1 week to monitor renal function and electrolytes  -Patient will monitor home blood pressure reading let our office know if significantly elevated greater than 130/80's MHG for further up titration of medical therapy.  Other hyperlipidemia  -Counseled patient on dietary and lifestyle modifications  -Continue atorvastatin 20 mg daily  -Will repeat CMP and fasting lipid panel in 6 months prior to next office visit  Overweight (BMI 25.0-29.9)  -Counseled patient on dietary and lifestyle modifications  -Continue to monitor  Snoring  -Intermittent in nature  -Recommended sleep medicine evaluation for patient however at this time declines but will let us know if she changes her mind  -Continue to monitor  Chronic kidney disease, unspecified CKD stage  Lab Results   Component Value Date    EGFR 42 09/11/2024    EGFR 44 01/22/2024    EGFR 48 11/14/2023    CREATININE 1.21 09/11/2024    CREATININE 1.16 01/22/2024    CREATININE 1.09 11/14/2023   -Continue to monitor renal function and electrolytes and if worsening will recommend nephrology evaluation at that time  -Counseled patient on NSAID avoidance as well as to avoid additional nephrotoxic agents.      Follow up Plan/Other summary comments:  -Lipid panel 9/11/2024 showing total cholesterol 160, triglyceride 141, HDL 57, LDL 75  -Follow-up pending carotid ultrasound  -Discussed with patient all recommendations for up titration and cholesterol medications however at this time she declines but wishes to trial better with dietary lifestyle  modifications  -Will repeat fasting lipid panel and CMP in 6 months prior to next office visit  -Given blood pressure elevation in office today and per patient at home mildly elevated will increase lisinopril from current 2.5 mg daily to 10 mg daily and monitor response will repeat BMP in 1 week to monitor renal function and electrolytes  -Patient counseled on dietary and lifestyle modifications including following a low-salt, low-fat, heart healthy diet with sodium restriction to less than 1800 mg of sodium daily, DASH diet and NSAID avoidance.  -Patient will monitor home blood pressure readings and let her office know significantly elevated greater than 130/80's MHG for up titration of medical therapy  -Again discussed with patient all recommendations for neurology follow-up and sleep medicine evaluation at this time declines but will let us know if she changes her mind  -Patient counseled if she were to have any warning or alarm type symptoms she is to seek emergency medical care immediately  -Patient will continue to follow with vascular team for carotid stenosis.    HPI:   -Patient is an 79-year-old female with hypertension, hyperlipidemia who was initially seen and evaluated [facility] in March 2023 for dizziness and fall.  At that time patient had been leaning over for an extended period of time in her garden bed became lightheaded and fell.  She did not have any loss of consciousness but did strike her head which caused a laceration to her left eyebrow and therefore presented to the emergency department for further evaluation.  In the emergency department patient's blood pressures were significantly elevated 26/113 mmHg and underwent evaluation and was eventually able to be discharged to outpatient follow-up and seen in May 2023.  She had been having some issues with continued lightheadedness and was recommended to see neurology as well as better blood pressure control however not make  "appointment and was seen and evaluated by ENT and diagnosed with vertigo and has been undergoing therapy for this.  -Currently in the office today she denies any chest pain, palpitations, lightheadedness or dizziness, loss of consciousness, shortness of breath, lower extremity edema, orthopnea or bendopnea.  Notes blood pressures at home predominantly in the 140s mmHg systolic range.  She denies any other exertional component and is otherwise states that she is feeling well.        Most recent or relevant cardiac/vascular testing:    -Transthoracic echocardiogram 6/26/2023 showing left ventricular systolic function normal stated LVEF  to 55% with grade 1 diastolic dysfunction, mild tricuspid regurgitation with IVC normal in size.      -Holter monitor 6/5/2023 showing predominantly sinus rhythm average heart rate 74 bpm with rare ectopic activity.    -Carotid ultrasound 6/26/2023 showing less than 50% stenosis of bilateral internal carotid arteries.      Past Surgical History:   Procedure Laterality Date    HYSTERECTOMY      OOPHORECTOMY         Review of Systems   Review of Systems   Constitutional:  Negative for chills, diaphoresis, fatigue and fever.   HENT:  Negative for trouble swallowing and voice change.    Eyes:  Negative for pain and redness.   Respiratory:  Negative for shortness of breath and wheezing.    Cardiovascular:  Negative for chest pain, palpitations and leg swelling.   Gastrointestinal:  Negative for abdominal pain, constipation, diarrhea, nausea and vomiting.   Genitourinary:  Negative for dysuria.   Musculoskeletal:  Positive for arthralgias. Negative for neck pain and neck stiffness.   Skin:  Negative for rash.   Neurological:  Negative for dizziness, syncope, light-headedness and headaches.   Psychiatric/Behavioral:  Negative for agitation and confusion.    All other systems reviewed and are negative.         Objective:     /96   Pulse 88   Ht 5' 2.5\" (1.588 m)   Wt 67.6 kg (149 lb)  "  BMI 26.82 kg/m²     PHYSICAL EXAM:  Physical Exam  Vitals reviewed.   Constitutional:       General: She is not in acute distress.     Appearance: Normal appearance. She is not diaphoretic.   HENT:      Head: Normocephalic and atraumatic.   Eyes:      General:         Right eye: No discharge.         Left eye: No discharge.   Neck:      Comments: Trachea midline, no significant JVD appreciated  Cardiovascular:      Rate and Rhythm: Normal rate and regular rhythm.      Heart sounds:      No friction rub.   Pulmonary:      Effort: Pulmonary effort is normal. No respiratory distress.      Breath sounds: No wheezing.   Chest:      Chest wall: No tenderness.   Abdominal:      General: Bowel sounds are normal.      Palpations: Abdomen is soft.      Tenderness: There is no abdominal tenderness. There is no rebound.   Musculoskeletal:      Right lower leg: No edema.      Left lower leg: No edema.   Skin:     General: Skin is warm and dry.   Neurological:      Mental Status: She is alert.      Comments: Awake, alert, able to answer questions appropriately, able to move extremities bilaterally.   Psychiatric:         Mood and Affect: Mood normal.         Behavior: Behavior normal.          Meds reviewed.  Current Outpatient Medications on File Prior to Visit   Medication Sig Dispense Refill    aspirin 81 mg chewable tablet Chew 1 tablet (81 mg total) daily      atorvastatin (LIPITOR) 20 mg tablet take 1 tablet by mouth once daily 90 tablet 1    BYSTOLIC 5 MG tablet Take 2.5 mg by mouth daily      [DISCONTINUED] lisinopril (ZESTRIL) 2.5 mg tablet take 1 tablet by mouth once daily 90 tablet 3     No current facility-administered medications on file prior to visit.      Past Medical History:   Diagnosis Date    Hypercholesteremia     Hypertension            Social History     Tobacco Use   Smoking Status Never   Smokeless Tobacco Never     Family History   Problem Relation Age of Onset    Cancer Mother     Cervical cancer  Mother     No Known Problems Sister     No Known Problems Maternal Grandmother     No Known Problems Paternal Grandmother     No Known Problems Sister     No Known Problems Maternal Aunt     No Known Problems Maternal Aunt     Breast cancer Paternal Aunt

## 2024-11-12 NOTE — ASSESSMENT & PLAN NOTE
-Mildly elevated in office today and patient notes at home  -Will increase lisinopril from 2.5 mg daily to 10 mg daily and repeat BMP in 1 week to monitor renal function and electrolytes  -Patient will monitor home blood pressure reading let our office know if significantly elevated greater than 130/80's MHG for further up titration of medical therapy.

## 2024-11-12 NOTE — ASSESSMENT & PLAN NOTE
-Intermittent in nature  -Recommended sleep medicine evaluation for patient however at this time declines but will let us know if she changes her mind  -Continue to monitor

## 2024-11-12 NOTE — ASSESSMENT & PLAN NOTE
Lab Results   Component Value Date    EGFR 42 09/11/2024    EGFR 44 01/22/2024    EGFR 48 11/14/2023    CREATININE 1.21 09/11/2024    CREATININE 1.16 01/22/2024    CREATININE 1.09 11/14/2023   -Continue to monitor renal function and electrolytes and if worsening will recommend nephrology evaluation at that time  -Counseled patient on NSAID avoidance as well as to avoid additional nephrotoxic agents.

## 2025-04-15 DIAGNOSIS — E78.5 HYPERLIPIDEMIA, UNSPECIFIED HYPERLIPIDEMIA TYPE: ICD-10-CM

## 2025-04-16 RX ORDER — ATORVASTATIN CALCIUM 20 MG/1
20 TABLET, FILM COATED ORAL DAILY
Qty: 90 TABLET | Refills: 1 | Status: SHIPPED | OUTPATIENT
Start: 2025-04-16

## 2025-06-10 ENCOUNTER — OFFICE VISIT (OUTPATIENT)
Dept: CARDIOLOGY CLINIC | Facility: CLINIC | Age: 80
End: 2025-06-10
Payer: COMMERCIAL

## 2025-06-10 VITALS
DIASTOLIC BLOOD PRESSURE: 76 MMHG | RESPIRATION RATE: 18 BRPM | WEIGHT: 152 LBS | SYSTOLIC BLOOD PRESSURE: 134 MMHG | HEART RATE: 98 BPM | HEIGHT: 62 IN | OXYGEN SATURATION: 98 % | BODY MASS INDEX: 27.97 KG/M2

## 2025-06-10 DIAGNOSIS — R06.83 SNORING: ICD-10-CM

## 2025-06-10 DIAGNOSIS — I10 PRIMARY HYPERTENSION: ICD-10-CM

## 2025-06-10 DIAGNOSIS — I10 PRIMARY HYPERTENSION: Primary | ICD-10-CM

## 2025-06-10 DIAGNOSIS — E78.49 OTHER HYPERLIPIDEMIA: ICD-10-CM

## 2025-06-10 DIAGNOSIS — I65.23 ASYMPTOMATIC BILATERAL CAROTID ARTERY STENOSIS: ICD-10-CM

## 2025-06-10 DIAGNOSIS — E66.3 OVERWEIGHT (BMI 25.0-29.9): ICD-10-CM

## 2025-06-10 PROCEDURE — 99214 OFFICE O/P EST MOD 30 MIN: CPT | Performed by: INTERNAL MEDICINE

## 2025-06-10 RX ORDER — LISINOPRIL 10 MG/1
10 TABLET ORAL DAILY
Qty: 90 TABLET | Refills: 3 | Status: SHIPPED | OUTPATIENT
Start: 2025-06-10

## 2025-06-10 NOTE — ASSESSMENT & PLAN NOTE
- Counseled patient on dietary and lifestyle modifications  - Will follow-up pending laboratory study results  - Continue atorvastatin 20 mg daily

## 2025-06-10 NOTE — ASSESSMENT & PLAN NOTE
- Patient notes blood pressures at home better controlled therefore we will continue lisinopril 10 mg daily and Bystolic 2.5 mg daily  -Continue to monitor

## 2025-06-10 NOTE — ASSESSMENT & PLAN NOTE
- Intermittent in nature  -Patient understands recommendations for sleep medicine evaluation however at this time declines but will let us know if she changes her mind  -Continue to monitor

## 2025-06-10 NOTE — ASSESSMENT & PLAN NOTE
- Patient denies significant symptoms  -Continue atorvastatin 20 mg daily  -Patient will follow-up with vascular team for ongoing monitoring

## 2025-06-10 NOTE — TELEPHONE ENCOUNTER
Helene POOLE  [facility]  Bibi forgot to tell the doctor today at her visit that she will need more refills of her Lisinopril 10 mg  one tab daily sent to [facility] in [place].  Dr Reyes.   She gets the 90 day supply with 3 refills

## 2025-06-10 NOTE — PROGRESS NOTES
Patient ID: Bibi Hanna is a 79 y.o. female.        Plan:      Assessment & Plan  Primary hypertension  - Patient notes blood pressures at home better controlled therefore we will continue lisinopril 10 mg daily and Bystolic 2.5 mg daily  -Continue to monitor  Asymptomatic bilateral carotid artery stenosis  - Patient denies significant symptoms  -Continue atorvastatin 20 mg daily  -Patient will follow-up with vascular team for ongoing monitoring  Snoring  - Intermittent in nature  -Patient understands recommendations for sleep medicine evaluation however at this time declines but will let us know if she changes her mind  -Continue to monitor  Overweight (BMI 25.0-29.9)  - Counseled patient on dietary and lifestyle modifications  -Continue to monitor  Other hyperlipidemia  - Counseled patient on dietary and lifestyle modifications  - Will follow-up pending laboratory study results  - Continue atorvastatin 20 mg daily      Follow up Plan/Other summary comments:  -Counseled patient on the importance of completion of the laboratory studies ordered in November 2024 along with follow-up with vascular team and imaging  - Lipid panel 9/11/2024 showing total cholesterol 160, triglyceride 141, HDL 57, LDL 75  - Counseled patient on dietary and lifestyle modifications including following a low-salt, low-fat, heart healthy diet with sodium restriction to less than 1800 mg of sodium daily, DASH diet, NSAID avoidance  - Patient should monitor home blood pressure readings let our office know if significantly elevated greater than 130/80's mmHg for up titration of medical therapy  - Patient will continue lisinopril 10 mg daily, Bystolic 2.5 mg daily, atorvastatin 20 mg daily  - I will see patient in 6 months or sooner if necessary  - Patient again counseled on recommendations for neurologic compensate medicine evaluation  - Patient counseled if she were to have any warning or alarm type symptoms she is to seek emergency  medical care immediately.    HPI:   - Patient is an 79-year-old female with hypertension, hyperlipidemia initially seen and evaluated at [facility] in March 2023 for dizziness and fall.  At that time patient had been leaning over for an extended period of time in her garden and became lightheaded and fell.  She did not have any loss of consciousness but did strike her head which caused a laceration to her left eyebrow and therefore presented to the emergency department for further evaluation.  In emergency department patient's blood pressure was significantly elevated greater than 200 mmHg systolic and underwent evaluation and was eventually able to be discharged to outpatient follow-up.  She was seen in May 2023.  She has been having some issues with continued lightheadedness and was recommended to undergo neurologic evaluation as well as better blood pressure control however was seen and evaluated by ENT and diagnosed with vertigo and underwent therapy for this with some improvement in symptomatology.  - She presents to the office today for scheduled follow-up.  Currently in the office today patient denies any chest pain, palpitations, lightheadedness or dizziness, loss of consciousness, shortness of breath, lower extremity edema, orthopnea or bendopna.  She notes on higher dose of lisinopril blood pressures at home are better controlled 120-130 mmHg systolic range and she no longer has significant issue with dizziness.      Most recent or relevant cardiac/vascular testing:    -Transthoracic echocardiogram 6/26/2023 showing left ventricular systolic function normal standard LVEF 55% with grade 1 diastolic dysfunction, mild tricuspid regurgitation IVC normal in size    -Holter monitor 6/5/2023 showing predominantly sinus rhythm average heart rate 74 bpm with rare ectopic activity    -Carotid ultrasound 6/26/2023 showing less than 50% stenosis in bilateral internal carotid arteries.    -ECG performed  "in the office today 6/10/2025 showing sinus rhythm with sinus arrhythmia heart rate 88 bpm with possible left atrial enlargement.      Past Surgical History[1]      Review of Systems   Review of Systems   Constitutional:  Negative for chills, diaphoresis, fatigue and fever.   HENT:  Negative for trouble swallowing and voice change.    Eyes:  Negative for pain and redness.   Respiratory:  Negative for shortness of breath and wheezing.    Cardiovascular:  Negative for chest pain, palpitations and leg swelling.   Gastrointestinal:  Negative for abdominal pain, constipation, diarrhea, nausea and vomiting.   Genitourinary:  Negative for dysuria.   Musculoskeletal:  Positive for arthralgias. Negative for neck pain and neck stiffness.   Skin:  Negative for rash.   Neurological:  Negative for dizziness, syncope, light-headedness and headaches.   Psychiatric/Behavioral:  Negative for agitation and confusion.    All other systems reviewed and are negative.         Objective:     /76 (BP Location: Left arm, Patient Position: Sitting, Cuff Size: Standard)   Pulse 98   Resp 18   Ht 5' 2.4\" (1.585 m)   Wt 68.9 kg (152 lb)   SpO2 98%   BMI 27.44 kg/m²     PHYSICAL EXAM:  Physical Exam  Vitals reviewed.   Constitutional:       General: She is not in acute distress.     Appearance: Normal appearance. She is not diaphoretic.   HENT:      Head: Normocephalic and atraumatic.     Eyes:      General:         Right eye: No discharge.         Left eye: No discharge.     Neck:      Comments: Trachea midline, no significant JVD appreciated  Cardiovascular:      Rate and Rhythm: Normal rate and regular rhythm.      Heart sounds:      No friction rub.   Pulmonary:      Effort: Pulmonary effort is normal. No respiratory distress.      Breath sounds: No wheezing.   Chest:      Chest wall: No tenderness.   Abdominal:      General: There is no distension.      Palpations: Abdomen is soft.      Tenderness: There is no abdominal " tenderness. There is no rebound.     Musculoskeletal:      Right lower leg: No edema.      Left lower leg: No edema.     Skin:     General: Skin is warm and dry.     Neurological:      Mental Status: She is alert.      Comments: Awake, alert, able to answer questions appropriately, able to move extremities bilaterally.   Psychiatric:         Mood and Affect: Mood normal.         Behavior: Behavior normal.            Meds reviewed.  Medications Ordered Prior to Encounter[2]   Past Medical History[3]        Tobacco Use History[4]    Family History[5]               [1]   Past Surgical History:  Procedure Laterality Date    HYSTERECTOMY      OOPHORECTOMY     [2]   Current Outpatient Medications on File Prior to Visit   Medication Sig Dispense Refill    aspirin 81 mg chewable tablet Chew 1 tablet (81 mg total) daily      atorvastatin (LIPITOR) 20 mg tablet take 1 tablet by mouth daily 90 tablet 1    BYSTOLIC 5 MG tablet Take 2.5 mg by mouth in the morning.      lisinopril (ZESTRIL) 10 mg tablet Take 1 tablet (10 mg total) by mouth daily 90 tablet 2     No current facility-administered medications on file prior to visit.   [3]   Past Medical History:  Diagnosis Date    Hypercholesteremia     Hypertension    [4]   Social History  Tobacco Use   Smoking Status Never   Smokeless Tobacco Never   [5]   Family History  Problem Relation Name Age of Onset    Cancer Mother      Cervical cancer Mother      No Known Problems Sister margarito     No Known Problems Maternal Grandmother      No Known Problems Paternal Grandmother      No Known Problems Sister eran     No Known Problems Maternal Aunt barbara     No Known Problems Maternal Aunt donna     Breast cancer Paternal Aunt brina

## 2025-07-30 DIAGNOSIS — E78.5 HYPERLIPIDEMIA, UNSPECIFIED HYPERLIPIDEMIA TYPE: ICD-10-CM

## 2025-07-30 RX ORDER — ATORVASTATIN CALCIUM 20 MG/1
20 TABLET, FILM COATED ORAL DAILY
Qty: 90 TABLET | Refills: 3 | Status: SHIPPED | OUTPATIENT
Start: 2025-07-30

## 2025-08-19 ENCOUNTER — HOSPITAL ENCOUNTER (OUTPATIENT)
Dept: CT IMAGING | Facility: HOSPITAL | Age: 80
Discharge: HOME | End: 2025-08-19
Attending: INTERNAL MEDICINE
Payer: COMMERCIAL

## 2025-08-19 DIAGNOSIS — R41.3 OTHER AMNESIA: ICD-10-CM

## 2025-08-19 PROCEDURE — 70450 CT HEAD/BRAIN W/O DYE: CPT
